# Patient Record
Sex: FEMALE | Race: WHITE | Employment: FULL TIME | URBAN - METROPOLITAN AREA
[De-identification: names, ages, dates, MRNs, and addresses within clinical notes are randomized per-mention and may not be internally consistent; named-entity substitution may affect disease eponyms.]

---

## 2020-03-02 ENCOUNTER — OFFICE VISIT (OUTPATIENT)
Dept: FAMILY MEDICINE CLINIC | Age: 30
End: 2020-03-02
Payer: COMMERCIAL

## 2020-03-02 VITALS
HEIGHT: 67 IN | DIASTOLIC BLOOD PRESSURE: 84 MMHG | OXYGEN SATURATION: 98 % | HEART RATE: 87 BPM | SYSTOLIC BLOOD PRESSURE: 122 MMHG | WEIGHT: 218.6 LBS | BODY MASS INDEX: 34.31 KG/M2 | TEMPERATURE: 97.2 F

## 2020-03-02 LAB
A/G RATIO: 1.6 (ref 1.1–2.2)
ALBUMIN SERPL-MCNC: 4.3 G/DL (ref 3.4–5)
ALP BLD-CCNC: 82 U/L (ref 40–129)
ALT SERPL-CCNC: 41 U/L (ref 10–40)
ANION GAP SERPL CALCULATED.3IONS-SCNC: 14 MMOL/L (ref 3–16)
AST SERPL-CCNC: 21 U/L (ref 15–37)
BASOPHILS ABSOLUTE: 0.1 K/UL (ref 0–0.2)
BASOPHILS RELATIVE PERCENT: 0.7 %
BILIRUB SERPL-MCNC: 0.3 MG/DL (ref 0–1)
BUN BLDV-MCNC: 8 MG/DL (ref 7–20)
C-REACTIVE PROTEIN: 4.4 MG/L (ref 0–5.1)
CALCIUM SERPL-MCNC: 9.2 MG/DL (ref 8.3–10.6)
CHLORIDE BLD-SCNC: 102 MMOL/L (ref 99–110)
CO2: 23 MMOL/L (ref 21–32)
CREAT SERPL-MCNC: 0.6 MG/DL (ref 0.6–1.1)
EOSINOPHILS ABSOLUTE: 0.1 K/UL (ref 0–0.6)
EOSINOPHILS RELATIVE PERCENT: 1.7 %
GFR AFRICAN AMERICAN: >60
GFR NON-AFRICAN AMERICAN: >60
GLOBULIN: 2.7 G/DL
GLUCOSE BLD-MCNC: 82 MG/DL (ref 70–99)
HBA1C MFR BLD: 5.1 %
HCT VFR BLD CALC: 43.4 % (ref 36–48)
HEMOGLOBIN: 14.5 G/DL (ref 12–16)
LYMPHOCYTES ABSOLUTE: 2.1 K/UL (ref 1–5.1)
LYMPHOCYTES RELATIVE PERCENT: 24.4 %
MCH RBC QN AUTO: 30.2 PG (ref 26–34)
MCHC RBC AUTO-ENTMCNC: 33.3 G/DL (ref 31–36)
MCV RBC AUTO: 90.7 FL (ref 80–100)
MONOCYTES ABSOLUTE: 0.4 K/UL (ref 0–1.3)
MONOCYTES RELATIVE PERCENT: 4.7 %
NEUTROPHILS ABSOLUTE: 5.9 K/UL (ref 1.7–7.7)
NEUTROPHILS RELATIVE PERCENT: 68.5 %
PDW BLD-RTO: 13.8 % (ref 12.4–15.4)
PLATELET # BLD: 253 K/UL (ref 135–450)
PMV BLD AUTO: 9.2 FL (ref 5–10.5)
POTASSIUM SERPL-SCNC: 4.2 MMOL/L (ref 3.5–5.1)
RBC # BLD: 4.78 M/UL (ref 4–5.2)
SEDIMENTATION RATE, ERYTHROCYTE: 9 MM/HR (ref 0–20)
SODIUM BLD-SCNC: 139 MMOL/L (ref 136–145)
TOTAL PROTEIN: 7 G/DL (ref 6.4–8.2)
TSH REFLEX: 1.74 UIU/ML (ref 0.27–4.2)
WBC # BLD: 8.6 K/UL (ref 4–11)

## 2020-03-02 PROCEDURE — 1036F TOBACCO NON-USER: CPT | Performed by: FAMILY MEDICINE

## 2020-03-02 PROCEDURE — 36415 COLL VENOUS BLD VENIPUNCTURE: CPT | Performed by: FAMILY MEDICINE

## 2020-03-02 PROCEDURE — 83036 HEMOGLOBIN GLYCOSYLATED A1C: CPT | Performed by: FAMILY MEDICINE

## 2020-03-02 PROCEDURE — G8484 FLU IMMUNIZE NO ADMIN: HCPCS | Performed by: FAMILY MEDICINE

## 2020-03-02 PROCEDURE — 99203 OFFICE O/P NEW LOW 30 MIN: CPT | Performed by: FAMILY MEDICINE

## 2020-03-02 PROCEDURE — G8427 DOCREV CUR MEDS BY ELIG CLIN: HCPCS | Performed by: FAMILY MEDICINE

## 2020-03-02 PROCEDURE — G8417 CALC BMI ABV UP PARAM F/U: HCPCS | Performed by: FAMILY MEDICINE

## 2020-03-02 RX ORDER — BUSPIRONE HYDROCHLORIDE 10 MG/1
10 TABLET ORAL 3 TIMES DAILY PRN
Qty: 90 TABLET | Refills: 0 | Status: SHIPPED | OUTPATIENT
Start: 2020-03-02 | End: 2020-04-01

## 2020-03-02 RX ORDER — SERTRALINE HYDROCHLORIDE 25 MG/1
25 TABLET, FILM COATED ORAL DAILY
Qty: 30 TABLET | Refills: 0 | Status: SHIPPED | OUTPATIENT
Start: 2020-03-02 | End: 2021-02-22 | Stop reason: SDUPTHER

## 2020-03-02 ASSESSMENT — ENCOUNTER SYMPTOMS
VOMITING: 0
COUGH: 0
NAUSEA: 0
ABDOMINAL PAIN: 0
BLOOD IN STOOL: 0
SHORTNESS OF BREATH: 0

## 2020-03-02 NOTE — PROGRESS NOTES
Subjective:      Patient ID: Mikel Peres is a 26 y.o. female. Renetta Holbrook is here to [facility]. Last PCP care was over 3 years ago. Vision changes  Past couple months, happening daily. Flashing light, vision loss. Episodes lasts a few seconds. Feels normal after the episode. No eye doctor care for this. Lipoma  History of lipoma's on her back, feels she has a new one. Depression  Recurrent/Chronic. Last medication was Remeron - diagnosed via [facility] with PTSD. She was also given hydroxyzine, and didn't feel like either one worked. Skin  For over the past 3 months - prolonged healing with hyperpigmentation of skin lesions. Legs more than arms. Review of Systems   Constitutional: Negative for fever and unexpected weight change. HENT: Negative for hearing loss. Eyes: Positive for visual disturbance. Respiratory: Negative for cough and shortness of breath. Cardiovascular: Negative for chest pain, palpitations and leg swelling. Gastrointestinal: Negative for abdominal pain, blood in stool, nausea and vomiting. Endocrine: Positive for polydipsia. Negative for cold intolerance, heat intolerance and polyphagia. Genitourinary: Negative for dysuria and hematuria. Skin: Positive for wound (slow healing wounds of lower extremeities after injury). Negative for rash. Neurological: Positive for numbness and headaches. Negative for dizziness, weakness and light-headedness. Hematological: Negative for adenopathy. Does not bruise/bleed easily. Psychiatric/Behavioral: Positive for dysphoric mood. Negative for sleep disturbance. The patient is not nervous/anxious.       Past Medical History:   Diagnosis Date    Gestational diabetes      Past Surgical History:   Procedure Laterality Date    TUBAL LIGATION      TYMPANOSTOMY TUBE PLACEMENT      x 7     Social History     Socioeconomic History    Marital status: Single     Spouse name: Not on file    Number of children: Not on file    Years of education: Not on file    Highest education level: Not on file   Occupational History    Not on file   Social Needs    Financial resource strain: Not on file    Food insecurity:     Worry: Not on file     Inability: Not on file    Transportation needs:     Medical: Not on file     Non-medical: Not on file   Tobacco Use    Smoking status: Never Smoker    Smokeless tobacco: Never Used   Substance and Sexual Activity    Alcohol use: Yes     Comment: weekly in the winter, rare rest of the year.  Drug use: No    Sexual activity: Not on file   Lifestyle    Physical activity:     Days per week: Not on file     Minutes per session: Not on file    Stress: Not on file   Relationships    Social connections:     Talks on phone: Not on file     Gets together: Not on file     Attends Mu-ism service: Not on file     Active member of club or organization: Not on file     Attends meetings of clubs or organizations: Not on file     Relationship status: Not on file    Intimate partner violence:     Fear of current or ex partner: Not on file     Emotionally abused: Not on file     Physically abused: Not on file     Forced sexual activity: Not on file   Other Topics Concern    Not on file   Social History Narrative    Not on file     Family History   Problem Relation Age of Onset    High Blood Pressure Mother     High Cholesterol Mother     Hypothyroidism Mother     Other Mother         gerd    High Blood Pressure Father     Diabetes Father         pre-DM   ,    Objective:   Physical Exam  Vitals signs and nursing note reviewed. Constitutional:       Appearance: Normal appearance. She is not ill-appearing. HENT:      Head: Normocephalic and atraumatic. Right Ear: External ear normal.      Left Ear: External ear normal.      Mouth/Throat:      Mouth: Mucous membranes are moist.   Eyes:      Extraocular Movements: Extraocular movements intact.       Conjunctiva/sclera: Conjunctivae PROTEIN   6. History of gestational diabetes  POCT glycosylated hemoglobin (Hb A1C)   7. Polydipsia  POCT glycosylated hemoglobin (Hb A1C)   8. Establishing care with new doctor, encounter for            Plan:      1. Start sertraline, as her mother is on that and works well. Allow time to become effective. Buspar for prn use. 2. Establish with optometrist for further evaluation. 3. Return to surgeon for removal.     4,5 & 7. A1c was 5.1% - unlikely DM, given the duration. Will check labs for causes and then follow up. 6. No DM currently, observe. 8. Visit to [facility] care. Follow up 1 month: D&A, labs, skin.        Current Outpatient Medications:     levonorgestrel (MIRENA, 52 MG,) IUD 52 mg, 1 each by Intrauterine route once, Disp: , Rfl:     sertraline (ZOLOFT) 25 MG tablet, Take 1 tablet by mouth daily, Disp: 30 tablet, Rfl: 0    busPIRone (BUSPAR) 10 MG tablet, Take 1 tablet by mouth 3 times daily as needed (anxiety), Disp: 90 tablet, Rfl: 0         Godwin Mckinnon MD

## 2020-06-11 ENCOUNTER — HOSPITAL ENCOUNTER (EMERGENCY)
Age: 30
Discharge: HOME | End: 2020-06-11
Attending: EMERGENCY MEDICINE
Payer: COMMERCIAL

## 2020-06-11 ENCOUNTER — APPOINTMENT (OUTPATIENT)
Dept: GENERAL RADIOLOGY | Age: 30
End: 2020-06-11
Payer: COMMERCIAL

## 2020-06-11 VITALS
BODY MASS INDEX: 35.31 KG/M2 | TEMPERATURE: 97 F | WEIGHT: 225 LBS | SYSTOLIC BLOOD PRESSURE: 146 MMHG | OXYGEN SATURATION: 97 % | RESPIRATION RATE: 18 BRPM | HEIGHT: 67 IN | DIASTOLIC BLOOD PRESSURE: 96 MMHG | HEART RATE: 89 BPM

## 2020-06-11 PROCEDURE — 73630 X-RAY EXAM OF FOOT: CPT

## 2020-06-11 PROCEDURE — 99283 EMERGENCY DEPT VISIT LOW MDM: CPT

## 2020-06-11 PROCEDURE — 73610 X-RAY EXAM OF ANKLE: CPT

## 2020-06-11 PROCEDURE — 6370000000 HC RX 637 (ALT 250 FOR IP): Performed by: EMERGENCY MEDICINE

## 2020-06-11 RX ORDER — BUSPIRONE HYDROCHLORIDE 10 MG/1
10 TABLET ORAL 3 TIMES DAILY
COMMUNITY
End: 2021-02-22 | Stop reason: SDUPTHER

## 2020-06-11 RX ORDER — NAPROXEN 500 MG/1
500 TABLET ORAL ONCE
Status: COMPLETED | OUTPATIENT
Start: 2020-06-11 | End: 2020-06-11

## 2020-06-11 RX ADMIN — NAPROXEN 500 MG: 500 TABLET ORAL at 20:54

## 2020-06-11 ASSESSMENT — PAIN DESCRIPTION - LOCATION: LOCATION: ANKLE

## 2020-06-11 ASSESSMENT — PAIN SCALES - GENERAL: PAINLEVEL_OUTOF10: 8

## 2020-06-11 ASSESSMENT — PAIN DESCRIPTION - DESCRIPTORS: DESCRIPTORS: BURNING;NUMBNESS

## 2020-06-11 ASSESSMENT — PAIN DESCRIPTION - FREQUENCY: FREQUENCY: CONTINUOUS

## 2020-06-11 ASSESSMENT — PAIN DESCRIPTION - PAIN TYPE: TYPE: ACUTE PAIN

## 2020-06-11 ASSESSMENT — PAIN DESCRIPTION - ORIENTATION: ORIENTATION: RIGHT

## 2020-06-11 ASSESSMENT — PAIN DESCRIPTION - ONSET: ONSET: SUDDEN

## 2020-06-12 NOTE — ED PROVIDER NOTES
weekly in the winter, rare rest of the year.  Drug use: No    Sexual activity: Not on file   Lifestyle    Physical activity     Days per week: Not on file     Minutes per session: Not on file    Stress: Not on file   Relationships    Social connections     Talks on phone: Not on file     Gets together: Not on file     Attends Druze service: Not on file     Active member of club or organization: Not on file     Attends meetings of clubs or organizations: Not on file     Relationship status: Not on file    Intimate partner violence     Fear of current or ex partner: Not on file     Emotionally abused: Not on file     Physically abused: Not on file     Forced sexual activity: Not on file   Other Topics Concern    Not on file   Social History Narrative    Not on file     No current facility-administered medications for this encounter. Current Outpatient Medications   Medication Sig Dispense Refill    busPIRone (BUSPAR) 10 MG tablet Take 10 mg by mouth 3 times daily      levonorgestrel (MIRENA, 52 MG,) IUD 52 mg 1 each by Intrauterine route once      sertraline (ZOLOFT) 25 MG tablet Take 1 tablet by mouth daily 30 tablet 0     No Known Allergies    Nursing Notes Reviewed    Physical Exam:  ED Triage Vitals [06/11/20 2034]   Enc Vitals Group      BP (!) 146/96      Pulse 89      Resp 18      Temp 97 °F (36.1 °C)      Temp Source Temporal      SpO2 97 %      Weight 225 lb (102.1 kg)      Height 5' 7\" (1.702 m)      Head Circumference       Peak Flow       Pain Score       Pain Loc       Pain Edu? Excl. in [address]? GENERAL APPEARANCE: Awake and alert. Cooperative. No acute distress. HEAD: Normocephalic. Atraumatic. EYES: EOM's grossly intact. Sclera anicteric. ENT: Tolerates saliva. No trismus. NECK: Supple. Trachea midline. CARDIO: Symmetric lower extremity pulses  LUNGS: Respirations unlabored. EXTREMITIES: No acute deformities.   Patient has edema across bilateral malleoli of the

## 2020-09-24 ENCOUNTER — APPOINTMENT (OUTPATIENT)
Dept: GENERAL RADIOLOGY | Age: 30
End: 2020-09-24
Payer: COMMERCIAL

## 2020-09-24 ENCOUNTER — HOSPITAL ENCOUNTER (EMERGENCY)
Age: 30
Discharge: HOME | End: 2020-09-24
Attending: EMERGENCY MEDICINE
Payer: COMMERCIAL

## 2020-09-24 VITALS
BODY MASS INDEX: 34.53 KG/M2 | SYSTOLIC BLOOD PRESSURE: 141 MMHG | TEMPERATURE: 96.9 F | RESPIRATION RATE: 16 BRPM | DIASTOLIC BLOOD PRESSURE: 90 MMHG | WEIGHT: 220 LBS | HEIGHT: 67 IN | HEART RATE: 90 BPM | OXYGEN SATURATION: 98 %

## 2020-09-24 PROCEDURE — 99283 EMERGENCY DEPT VISIT LOW MDM: CPT

## 2020-09-24 PROCEDURE — 73120 X-RAY EXAM OF HAND: CPT

## 2020-09-24 PROCEDURE — 73100 X-RAY EXAM OF WRIST: CPT

## 2020-09-24 RX ORDER — IBUPROFEN 200 MG
600 TABLET ORAL EVERY 8 HOURS PRN
Qty: 60 TABLET | Refills: 0 | OUTPATIENT
Start: 2020-09-24 | End: 2021-01-28

## 2020-09-24 ASSESSMENT — PAIN DESCRIPTION - PAIN TYPE: TYPE: ACUTE PAIN

## 2020-09-24 ASSESSMENT — PAIN DESCRIPTION - ORIENTATION: ORIENTATION: RIGHT

## 2020-09-24 ASSESSMENT — PAIN DESCRIPTION - LOCATION: LOCATION: HAND;WRIST

## 2020-09-24 ASSESSMENT — PAIN SCALES - GENERAL: PAINLEVEL_OUTOF10: 7

## 2020-09-24 NOTE — ED PROVIDER NOTES
[place] [address]  Chief Complaint   Patient presents with    Hand Pain     reports 2 weeks    Wrist Pain     since last evening after throwing a dart. HISTORY OF PRESENT ILLNESS  Nathalie Rosas is a 31 y.o. female who presents to the ED complaining of pain of the right palmar hand near the third MCP, with a palpable knot that has been getting bigger over that timeframe. She is never had what is before. It is not red or an open wound or associated with any specific injury. Yesterday she was throwing darts with the right hand and in the process of throwing the dark she had an acute wrist pain that shot pain through to the elbow, currently she only has wrist pain though. She does not know if this is related to the other right hand problem mentioned above. She denies any weakness to the right hand at all. She has not tried anything at all for the pain at any point. She is right-hand dominant. She denies any symptoms in the left hand, or the proximal right upper extremity, right elbow or right forearm. No other complaints, modifying factors or associated symptoms. Nursing notes reviewed.    Past Medical History:   Diagnosis Date    Gestational diabetes      Past Surgical History:   Procedure Laterality Date    TUBAL LIGATION      TYMPANOSTOMY TUBE PLACEMENT      x 7     Family History   Problem Relation Age of Onset    High Blood Pressure Mother     High Cholesterol Mother     Hypothyroidism Mother     Other Mother         gerd    High Blood Pressure Father     Diabetes Father         pre-DM     Social History     Socioeconomic History    Marital status: Single     Spouse name: Not on file    Number of children: Not on file    Years of education: Not on file    Highest education level: Not on file   Occupational History    Not on file   Social Needs    Financial resource strain: Not on file    Food insecurity     Worry: Not on file     Inability: Not on file    Transportation needs     Medical: Not on file     Non-medical: Not on file   Tobacco Use    Smoking status: Never Smoker    Smokeless tobacco: Never Used   Substance and Sexual Activity    Alcohol use: Not Currently     Comment: weekly in the winter, rare rest of the year.  Drug use: No    Sexual activity: Not on file   Lifestyle    Physical activity     Days per week: Not on file     Minutes per session: Not on file    Stress: Not on file   Relationships    Social connections     Talks on phone: Not on file     Gets together: Not on file     Attends Temple service: Not on file     Active member of club or organization: Not on file     Attends meetings of clubs or organizations: Not on file     Relationship status: Not on file    Intimate partner violence     Fear of current or ex partner: Not on file     Emotionally abused: Not on file     Physically abused: Not on file     Forced sexual activity: Not on file   Other Topics Concern    Not on file   Social History Narrative    Not on file     No current facility-administered medications for this encounter. Current Outpatient Medications   Medication Sig Dispense Refill    ibuprofen (ADVIL) 200 MG tablet Take 3 tablets by mouth every 8 hours as needed for Pain 60 tablet 0    busPIRone (BUSPAR) 10 MG tablet Take 10 mg by mouth 3 times daily      levonorgestrel (MIRENA, 52 MG,) IUD 52 mg 1 each by Intrauterine route once      sertraline (ZOLOFT) 25 MG tablet Take 1 tablet by mouth daily 30 tablet 0     No Known Allergies    REVIEW OF SYSTEMS  6 systems reviewed, pertinent positives per HPI otherwise noted to be negative    PHYSICAL EXAM   BP (!) 141/90   Pulse 90   Temp 96.9 °F (36.1 °C) (Tympanic)   Resp 16   Ht 5' 7\" (1.702 m)   Wt 220 lb (99.8 kg)   SpO2 98%   BMI 34.46 kg/m²    GENERAL APPEARANCE: Awake and alert. Cooperative. No acute distress. HEAD: Normocephalic. Atraumatic. EYES: PERRL. EOM's grossly intact. ENT: Mucous membranes are moist.   NECK: Supple. Normal ROM. CHEST: Equal symmetric chest rise. LUNGS: Breathing is unlabored. Speaking comfortably in full sentences. ABDOMEN: Nondistended, nontender  MUSCULOSKELETAL:  RUE: Mildly tender palpable mobile firm knot is noted along the flexor tendon sheath of the right middle finger near the MCP on the palmar aspect. No other hand tenderness is noted. There is mild tenderness diffusely over the carpal bones. No focal scaphoid tenderness. No forearm, elbow, or upper arm tenderness. 2+ radial pulse. Brisk cap refill x5 digits. Sensation and motor function fully intact in the radial, ulnar, and median nerve distribution. Full range of motion of all major joints. Cardinal movements of hand fully intact. No erythema, bruising, or lacerations. Comparments are soft. LUE:  No tenderness. 2+ radial pulse. Brisk cap refill x5 digits. Sensation and motor function fully intact in the radial, ulnar, and median nerve distribution. Full range of motion of all major joints. Cardinal movements of hand fully intact. No erythema, bruising, or lacerations. Comparments are soft. SKIN: Warm and dry. No acute rashes. NEUROLOGICAL: Alert and oriented. Strength is 5/5 in all extremities and sensation is intact. RADIOLOGY    Xr Wrist Right (2 Views)    Result Date: 9/24/2020  EXAMINATION: TWO XRAY VIEWS OF THE RIGHT HAND; 3  XRAY VIEWS OF THE RIGHT WRIST 9/24/2020 4:52 pm COMPARISON: None. HISTORY: ORDERING SYSTEM PROVIDED HISTORY: pain - attn: 3rd MCP joint palmar aspect (?cyst) TECHNOLOGIST PROVIDED HISTORY: Acuity: Acute Type of Exam: Initial Additional signs and symptoms: rt hand pain Relevant Medical/Surgical History: NKI RIGHT WRIST FINDINGS: Osseous structures of the wrist are intact and align normally. Joint spaces are well maintained. No retained radiopaque foreign body. Soft tissues appear unremarkable.  RIGHT HAND FINDINGS: Osseous structures of the right hand appear intact and align normally. No retained radiopaque foreign body. Joint spaces appear well maintained. 1. RIGHT HAND: No acute osseous abnormality evident. 2. RIGHT WRIST: No acute osseous abnormality is demonstrated. 3. Note that if pain persists or worsens, then additional evaluation with follow-up x-rays or MRI should be considered. Xr Hand Right (2 Views)    Result Date: 9/24/2020  EXAMINATION: TWO XRAY VIEWS OF THE RIGHT HAND; 3  XRAY VIEWS OF THE RIGHT WRIST 9/24/2020 4:52 pm COMPARISON: None. HISTORY: ORDERING SYSTEM PROVIDED HISTORY: pain - attn: 3rd MCP joint palmar aspect (?cyst) TECHNOLOGIST PROVIDED HISTORY: Acuity: Acute Type of Exam: Initial Additional signs and symptoms: rt hand pain Relevant Medical/Surgical History: NKI RIGHT WRIST FINDINGS: Osseous structures of the wrist are intact and align normally. Joint spaces are well maintained. No retained radiopaque foreign body. Soft tissues appear unremarkable. RIGHT HAND FINDINGS: Osseous structures of the right hand appear intact and align normally. No retained radiopaque foreign body. Joint spaces appear well maintained. 1. RIGHT HAND: No acute osseous abnormality evident. 2. RIGHT WRIST: No acute osseous abnormality is demonstrated. 3. Note that if pain persists or worsens, then additional evaluation with follow-up x-rays or MRI should be considered. ED COURSE/MDM  Differential diagnosis considerations included: abrasion/laceration, contusion, fracture, sprain/strain, dislocation, ganglion cyst, tendinitis    The patient's ED course was notable for appears to be a right flexor tendon ganglion cyst of the third finger, no signs of infectious process, no signs of neurovascular compromise or sensorimotor dysfunction or contracture. Patient also has a wrist sprain from throwing a dart.   X-rays are unremarkable of the hand and the wrist.  Patient was referred to orthopedics and placed in a Velcro wrist splint and prescribed NSAIDs. Patient was given scripts for the following medications. I counseled patient how to take these medications. New Prescriptions    IBUPROFEN (ADVIL) 200 MG TABLET    Take 3 tablets by mouth every 8 hours as needed for Pain         CLINICAL IMPRESSION  1. Ganglion cyst of flexor tendon sheath of finger of right hand    2. Acute pain of right wrist        Blood pressure (!) 141/90, pulse 90, temperature 96.9 °F (36.1 °C), temperature source Tympanic, resp. rate 16, height 5' 7\" (1.702 m), weight 220 lb (99.8 kg), SpO2 98 %. DISPOSITION    I have discussed the findings of today's workup with the patient and addressed the patient's questions and concerns. Important warning signs as well as new or worsening symptoms which would necessitate immediate return to the ED were discussed. The plan is to discharge from the ED at this time, and the patient is in stable condition. The patient acknowledged understanding is agreeable with this plan. Follow-up with:  Martha Masters MD  [address]  [phone]    Schedule an appointment as soon as possible for a visit in 1 week  For symptom re-evaluation    [facility]  [address] [facility]  [address] [place] [address] Road  [phone]  Go to   If symptoms worsen      This chart was created using Dragon dictation software. Efforts were made by me to ensure accuracy, however some errors may be present due to limitations of this technology.         Yossi Cruz MD  09/24/20 7488

## 2021-01-28 ENCOUNTER — HOSPITAL ENCOUNTER (EMERGENCY)
Age: 31
Discharge: HOME | End: 2021-01-28
Attending: EMERGENCY MEDICINE
Payer: COMMERCIAL

## 2021-01-28 VITALS
SYSTOLIC BLOOD PRESSURE: 143 MMHG | DIASTOLIC BLOOD PRESSURE: 97 MMHG | TEMPERATURE: 96.8 F | RESPIRATION RATE: 15 BRPM | OXYGEN SATURATION: 100 % | BODY MASS INDEX: 33.74 KG/M2 | HEART RATE: 82 BPM | WEIGHT: 215 LBS | HEIGHT: 67 IN

## 2021-01-28 DIAGNOSIS — M54.42 ACUTE LEFT-SIDED LOW BACK PAIN WITH LEFT-SIDED SCIATICA: Primary | ICD-10-CM

## 2021-01-28 PROCEDURE — 99283 EMERGENCY DEPT VISIT LOW MDM: CPT

## 2021-01-28 RX ORDER — LIDOCAINE 50 MG/G
1 PATCH TOPICAL DAILY
Qty: 10 PATCH | Refills: 0 | Status: SHIPPED | OUTPATIENT
Start: 2021-01-28 | End: 2021-02-07

## 2021-01-28 RX ORDER — METHYLPREDNISOLONE 4 MG/1
TABLET ORAL
Qty: 1 KIT | Refills: 0 | Status: SHIPPED | OUTPATIENT
Start: 2021-01-28 | End: 2021-02-22

## 2021-01-28 RX ORDER — NAPROXEN 500 MG/1
500 TABLET ORAL 2 TIMES DAILY PRN
Qty: 60 TABLET | Refills: 0 | Status: SHIPPED | OUTPATIENT
Start: 2021-01-28 | End: 2021-02-22 | Stop reason: ALTCHOICE

## 2021-01-28 RX ORDER — CYCLOBENZAPRINE HCL 10 MG
10 TABLET ORAL 3 TIMES DAILY PRN
Qty: 20 TABLET | Refills: 0 | Status: SHIPPED | OUTPATIENT
Start: 2021-01-28 | End: 2021-02-07

## 2021-01-28 ASSESSMENT — PAIN DESCRIPTION - FREQUENCY: FREQUENCY: CONTINUOUS

## 2021-01-28 ASSESSMENT — PAIN SCALES - GENERAL: PAINLEVEL_OUTOF10: 10

## 2021-01-28 ASSESSMENT — PAIN DESCRIPTION - LOCATION: LOCATION: BACK

## 2021-01-28 NOTE — ED PROVIDER NOTES
Activity    Alcohol use: Not Currently     Comment: weekly in the winter, rare rest of the year.  Drug use: No    Sexual activity: Not on file   Lifestyle    Physical activity     Days per week: Not on file     Minutes per session: Not on file    Stress: Not on file   Relationships    Social connections     Talks on phone: Not on file     Gets together: Not on file     Attends Anabaptism service: Not on file     Active member of club or organization: Not on file     Attends meetings of clubs or organizations: Not on file     Relationship status: Not on file    Intimate partner violence     Fear of current or ex partner: Not on file     Emotionally abused: Not on file     Physically abused: Not on file     Forced sexual activity: Not on file   Other Topics Concern    Not on file   Social History Narrative    Not on file     No current facility-administered medications for this encounter. Current Outpatient Medications   Medication Sig Dispense Refill    cyclobenzaprine (FLEXERIL) 10 MG tablet Take 1 tablet by mouth 3 times daily as needed for Muscle spasms (CAUTION: Can cause dizziness, don't drive while taking.) 20 tablet 0    lidocaine (LIDODERM) 5 % Place 1 patch onto the skin daily for 10 days 12 hours on, 12 hours off. 10 patch 0    methylPREDNISolone (MEDROL, LAURA,) 4 MG tablet Take by mouth.  1 kit 0    naproxen (NAPROSYN) 500 MG tablet Take 1 tablet by mouth 2 times daily as needed for Pain 60 tablet 0    busPIRone (BUSPAR) 10 MG tablet Take 10 mg by mouth 3 times daily      levonorgestrel (MIRENA, 52 MG,) IUD 52 mg 1 each by Intrauterine route once      sertraline (ZOLOFT) 25 MG tablet Take 1 tablet by mouth daily 30 tablet 0     No Known Allergies    REVIEW OF SYSTEMS  6 systems reviewed, pertinent positives per HPI otherwise noted to be negative    PHYSICAL EXAM   BP (!) 143/97   Pulse 82   Temp 96.8 °F (36 °C)   Resp 15   Ht 5' 7\" (1.702 m)   Wt 215 lb (97.5 kg)   SpO2 100% BMI 33.67 kg/m²    GENERAL APPEARANCE: Awake and alert. Cooperative. No acute distress. HEAD: Normocephalic. Atraumatic. EYES: PERRL. EOM's grossly intact. ENT: Mucous membranes are moist.   NECK: Supple. Normal ROM. CHEST: Equal symmetric chest rise. RRR  LUNGS: Breathing is unlabored. Speaking comfortably in full sentences. CTAB  ABDOMEN: Nondistended, nontender  BACK:      Cervical: no tenderness noted, no midline tenderness, no paraspinous spasm      Thoracic: no tenderness noted, no midline tenderness, no paraspinous spasm      Lumbar: L paraspinal ttp with spasm noted, no midline or R sided ttp on my exam.  +SLR LLE, neg SLR RLE. MUSCULOSKELETAL:  RLE: No tenderness. 2+ DP and PT. Sensation and motor function fully intact. Full range of motion of all major joints. No erythema, bruising, or lacerations. Compartments are soft. 2+ patellar reflex. Achilles nontender and intact. Able to bear weight. No joint swelling or effusions are noted. LLE: No tenderness. 2+ DP and PT. Sensation and motor function fully intact. Full range of motion of all major joints. No erythema, bruising, or lacerations. Compartments are soft. 2+ patellar reflex. Achilles nontender and intact. Able to bear weight. No joint swelling or effusions are noted. SKIN: Warm and dry. No acute rashes. NEUROLOGICAL: Alert and oriented. Strength is 5/5 in all extremities and sensation is intact. Gait normal.    ED COURSE/MDM  Differential diagnosis considerations included: abdominal aortic aneurysm, cauda equina syndrome, epidural mass lesion, spinal stenosis, herniated disk causing severe stenosis, sprain/strain, fracture, contusion, sciatica, UTI, pyelonephritis, kidney stone    The patient's ED course was notable for left-sided low back pain with sciatica clinically on examination. She exhibits no historical or physical exam findings red flags for cauda equina syndrome or spinal cord compression.   There has been no trauma. Given atraumatic back pain and [facility] CMT for back pain I do not feel that imaging is currently indicated. Recommended a conservative approach with the patient who can follow-up with her PCP in a week or so and if necessary obtain advanced imaging at that time. We will start her on naproxen instead of ibuprofen, add Medrol Dosepak Flexeril and Lidoderm patches and return precautions for new or worsening symptoms including red flag symptoms. Patient was given scripts for the following medications. I counseled patient how to take these medications. New Prescriptions    CYCLOBENZAPRINE (FLEXERIL) 10 MG TABLET    Take 1 tablet by mouth 3 times daily as needed for Muscle spasms (CAUTION: Can cause dizziness, don't drive while taking.)    LIDOCAINE (LIDODERM) 5 %    Place 1 patch onto the skin daily for 10 days 12 hours on, 12 hours off. METHYLPREDNISOLONE (MEDROL, LAURA,) 4 MG TABLET    Take by mouth. NAPROXEN (NAPROSYN) 500 MG TABLET    Take 1 tablet by mouth 2 times daily as needed for Pain         CLINICAL IMPRESSION  1. Acute left-sided low back pain with left-sided sciatica        Blood pressure (!) 143/97, pulse 82, temperature 96.8 °F (36 °C), resp. rate 15, height 5' 7\" (1.702 m), weight 215 lb (97.5 kg), SpO2 100 %. DISPOSITION    I have discussed the findings of today's workup with the patient and addressed the patient's questions and concerns. Important warning signs as well as new or worsening symptoms which would necessitate immediate return to the ED were discussed. The plan is to discharge from the ED at this time, and the patient is in stable condition. The patient acknowledged understanding is agreeable with this plan.       Follow-up with:  Mary Seay MD  [address] [facility]  [address] [facility] [address] [facility]  [phone]    Schedule an appointment as soon as possible for a visit in 1 week  For symptom re-evaluation    [address] [facility] [address] [facility]  [phone]  Go to   If symptoms worsen      This chart was created using Dragon dictation software. Efforts were made by me to ensure accuracy, however some errors may be present due to limitations of this technology.         Erwin Perez MD  01/28/21 3129

## 2021-02-22 ENCOUNTER — OFFICE VISIT (OUTPATIENT)
Dept: FAMILY MEDICINE CLINIC | Age: 31
End: 2021-02-22
Payer: COMMERCIAL

## 2021-02-22 VITALS
OXYGEN SATURATION: 98 % | WEIGHT: 215.4 LBS | HEART RATE: 103 BPM | BODY MASS INDEX: 33.74 KG/M2 | SYSTOLIC BLOOD PRESSURE: 130 MMHG | DIASTOLIC BLOOD PRESSURE: 86 MMHG | TEMPERATURE: 97.3 F

## 2021-02-22 DIAGNOSIS — Z76.89 ENCOUNTER TO ESTABLISH CARE: Primary | ICD-10-CM

## 2021-02-22 DIAGNOSIS — F32.A DEPRESSION, UNSPECIFIED DEPRESSION TYPE: ICD-10-CM

## 2021-02-22 DIAGNOSIS — H53.9 VISION CHANGES: ICD-10-CM

## 2021-02-22 DIAGNOSIS — Z76.0 MEDICATION REFILL: ICD-10-CM

## 2021-02-22 PROCEDURE — G8417 CALC BMI ABV UP PARAM F/U: HCPCS | Performed by: NURSE PRACTITIONER

## 2021-02-22 PROCEDURE — G8427 DOCREV CUR MEDS BY ELIG CLIN: HCPCS | Performed by: NURSE PRACTITIONER

## 2021-02-22 PROCEDURE — 99213 OFFICE O/P EST LOW 20 MIN: CPT | Performed by: NURSE PRACTITIONER

## 2021-02-22 PROCEDURE — G8484 FLU IMMUNIZE NO ADMIN: HCPCS | Performed by: NURSE PRACTITIONER

## 2021-02-22 PROCEDURE — 1036F TOBACCO NON-USER: CPT | Performed by: NURSE PRACTITIONER

## 2021-02-22 RX ORDER — SERTRALINE HYDROCHLORIDE 25 MG/1
25 TABLET, FILM COATED ORAL DAILY
Qty: 30 TABLET | Refills: 2 | Status: SHIPPED | OUTPATIENT
Start: 2021-02-22 | End: 2022-11-03

## 2021-02-22 RX ORDER — BUSPIRONE HYDROCHLORIDE 10 MG/1
10 TABLET ORAL 3 TIMES DAILY
Qty: 90 TABLET | Refills: 2 | Status: SHIPPED | OUTPATIENT
Start: 2021-02-22 | End: 2022-11-03

## 2021-02-22 ASSESSMENT — PATIENT HEALTH QUESTIONNAIRE - PHQ9
SUM OF ALL RESPONSES TO PHQ QUESTIONS 1-9: 15
4. FEELING TIRED OR HAVING LITTLE ENERGY: 3
6. FEELING BAD ABOUT YOURSELF - OR THAT YOU ARE A FAILURE OR HAVE LET YOURSELF OR YOUR FAMILY DOWN: 2
SUM OF ALL RESPONSES TO PHQ QUESTIONS 1-9: 15
SUM OF ALL RESPONSES TO PHQ QUESTIONS 1-9: 14
7. TROUBLE CONCENTRATING ON THINGS, SUCH AS READING THE NEWSPAPER OR WATCHING TELEVISION: 0
1. LITTLE INTEREST OR PLEASURE IN DOING THINGS: 3
8. MOVING OR SPEAKING SO SLOWLY THAT OTHER PEOPLE COULD HAVE NOTICED. OR THE OPPOSITE, BEING SO FIGETY OR RESTLESS THAT YOU HAVE BEEN MOVING AROUND A LOT MORE THAN USUAL: 2
10. IF YOU CHECKED OFF ANY PROBLEMS, HOW DIFFICULT HAVE THESE PROBLEMS MADE IT FOR YOU TO DO YOUR WORK, TAKE CARE OF THINGS AT HOME, OR GET ALONG WITH OTHER PEOPLE: 1
SUM OF ALL RESPONSES TO PHQ9 QUESTIONS 1 & 2: 3

## 2021-02-22 ASSESSMENT — COLUMBIA-SUICIDE SEVERITY RATING SCALE - C-SSRS
6. HAVE YOU EVER DONE ANYTHING, STARTED TO DO ANYTHING, OR PREPARED TO DO ANYTHING TO END YOUR LIFE?: NO
2. HAVE YOU ACTUALLY HAD ANY THOUGHTS OF KILLING YOURSELF?: NO

## 2021-02-23 ASSESSMENT — ENCOUNTER SYMPTOMS
WHEEZING: 0
COUGH: 0
EYE ITCHING: 0
EYE DISCHARGE: 0
PHOTOPHOBIA: 0
CHEST TIGHTNESS: 0
EYE PAIN: 0
SHORTNESS OF BREATH: 0
EYE REDNESS: 0

## 2021-02-23 NOTE — PROGRESS NOTES
Nathalie R Caufidelia  1990  31 y.o. SUBJECT CASIE:    Chief Complaint   Patient presents with   Ambreen Nieto is a 31year old female who is in to establish care. She states her last PCP had started anti-depression medication but she has been out since he left [facility] (almost 8 months). She states \"I thought I could do without but since the last 4 months I have been irritable and having a lot of anxiety. I think I need to be back on my medication. \"     She states she has two children, 7 yo and 2 yo. She states she went through a separation around October and since then has noted an increase in her symptoms of anxiety and depression. She states she has had some problems with mental health in the past and did see counseling. She states she works full time at [employer] as a supervisor and has some stress at work. Current Outpatient Medications on File Prior to Visit   Medication Sig Dispense Refill    levonorgestrel (MIRENA, 52 MG,) IUD 52 mg 1 each by Intrauterine route once      [DISCONTINUED] ibuprofen (ADVIL) 200 MG tablet Take 3 tablets by mouth every 8 hours as needed for Pain 60 tablet 0     No current facility-administered medications on file prior to visit.         Past Medical History:   Diagnosis Date    Gestational diabetes      Past Surgical History:   Procedure Laterality Date    TUBAL LIGATION      TYMPANOSTOMY TUBE PLACEMENT      x 7     Family History   Problem Relation Age of Onset    High Blood Pressure Mother     High Cholesterol Mother     Hypothyroidism Mother     Other Mother         gerd    High Blood Pressure Father     Diabetes Father         pre-DM     Social History     Socioeconomic History    Marital status: Single     Spouse name: Not on file    Number of children: Not on file    Years of education: Not on file    Highest education level: Not on file   Occupational History    Not on file   Social Needs    Financial resource strain: Not on file    Food insecurity     Worry: Not on file     Inability: Not on file    Transportation needs     Medical: Not on file     Non-medical: Not on file   Tobacco Use    Smoking status: Never Smoker    Smokeless tobacco: Never Used   Substance and Sexual Activity    Alcohol use: Not Currently     Comment: weekly in the winter, rare rest of the year.  Drug use: No    Sexual activity: Not on file   Lifestyle    Physical activity     Days per week: Not on file     Minutes per session: Not on file    Stress: Not on file   Relationships    Social connections     Talks on phone: Not on file     Gets together: Not on file     Attends Religion service: Not on file     Active member of club or organization: Not on file     Attends meetings of clubs or organizations: Not on file     Relationship status: Not on file    Intimate partner violence     Fear of current or ex partner: Not on file     Emotionally abused: Not on file     Physically abused: Not on file     Forced sexual activity: Not on file   Other Topics Concern    Not on file   Social History Narrative    Not on file       Review of Systems   Constitutional: Negative for activity change, appetite change, chills, diaphoresis, fatigue, fever and unexpected weight change. Eyes: Positive for visual disturbance (some blurriness). Negative for photophobia, pain, discharge, redness and itching. Respiratory: Negative for cough, chest tightness, shortness of breath and wheezing. Cardiovascular: Negative for chest pain and palpitations. Neurological: Negative for dizziness, seizures, syncope, weakness, light-headedness and headaches. Psychiatric/Behavioral: Positive for dysphoric mood and sleep disturbance. Negative for agitation, confusion, decreased concentration, self-injury and suicidal ideas. The patient is nervous/anxious.         OBJECTIVE:     /86 (Site: Right Upper Arm, Position: Sitting, Cuff Size: Large Adult)   Pulse 103   Temp 97.3 °F (36.3 °C) (Infrared)   Wt 215 lb 6.4 oz (97.7 kg)   SpO2 98%   BMI 33.74 kg/m²     Physical Exam  Vitals signs reviewed. Constitutional:       General: She is not in acute distress. Appearance: Normal appearance. She is well-developed. She is not ill-appearing or diaphoretic. HENT:      Head: Normocephalic and atraumatic. Right Ear: Tympanic membrane and external ear normal.      Left Ear: Tympanic membrane and external ear normal.      Nose: Nose normal. No congestion or rhinorrhea. Mouth/Throat:      Mouth: Mucous membranes are moist.      Pharynx: Oropharynx is clear. No oropharyngeal exudate or posterior oropharyngeal erythema. Eyes:      General: No scleral icterus. Conjunctiva/sclera: Conjunctivae normal.      Pupils: Pupils are equal, round, and reactive to light. Neck:      Musculoskeletal: Normal range of motion and neck supple. No neck rigidity or muscular tenderness. Cardiovascular:      Rate and Rhythm: Normal rate and regular rhythm. Heart sounds: Normal heart sounds. No murmur. No friction rub. No gallop. Pulmonary:      Effort: Pulmonary effort is normal. No respiratory distress. Breath sounds: Normal breath sounds. No wheezing. Chest:      Chest wall: No tenderness. Abdominal:      General: Abdomen is flat. Bowel sounds are normal. There is no distension. Palpations: Abdomen is soft. There is no mass. Tenderness: There is no abdominal tenderness. Musculoskeletal: Normal range of motion. Right lower leg: No edema. Left lower leg: No edema. Lymphadenopathy:      Cervical: No cervical adenopathy. Skin:     General: Skin is warm and dry. Neurological:      Mental Status: She is alert and oriented to person, place, and time. Motor: No weakness. Gait: Gait normal.   Psychiatric:         Behavior: Behavior normal.         Thought Content:  Thought content normal.         Judgment: Judgment normal.         No results found for requested labs within last 30 days. Hemoglobin A1C (%)   Date Value   03/02/2020 5.1       Lab Results   Component Value Date    WBC 8.6 03/02/2020    WBC 9.6 05/11/2015    NEUTROABS 5.9 03/02/2020    HGB 14.5 03/02/2020    HGB 13.6 05/11/2015    HCT 43.4 03/02/2020    HCT 39.8 05/11/2015    MCV 90.7 03/02/2020    MCV 90.7 05/11/2015     03/02/2020     05/11/2015    SEGSABS 6.6 05/11/2015    LYMPHSABS 2.1 03/02/2020    MONOSABS 0.4 03/02/2020    EOSABS 0.1 03/02/2020    BASOSABS 0.1 03/02/2020     No results found for: TSH, TSHHS  Lab Results   Component Value Date    LABALBU 4.3 03/02/2020    BILITOT 0.3 03/02/2020    AST 21 03/02/2020    ALT 41 03/02/2020    ALKPHOS 82 03/02/2020             No results found for this visit on 02/22/21. ASSESSMENT AND PLAN:     1. Depression, unspecified depression type  - sertraline (ZOLOFT) 25 MG tablet; Take 1 tablet by mouth daily  Dispense: 30 tablet; Refill: 2  - busPIRone (BUSPAR) 10 MG tablet; Take 1 tablet by mouth 3 times daily  Dispense: 90 tablet; Refill: 2  - Amb External Referral To Psychology  - COMPREHENSIVE METABOLIC PANEL; Future  - TSH without Reflex; Future  - CBC WITH AUTO DIFFERENTIAL; Future    2. Encounter to establish care    3. Medication refill  - sertraline (ZOLOFT) 25 MG tablet; Take 1 tablet by mouth daily  Dispense: 30 tablet; Refill: 2  - busPIRone (BUSPAR) 10 MG tablet; Take 1 tablet by mouth 3 times daily  Dispense: 90 tablet; Refill: 2    4. Vision changes  - HEMOGLOBIN A1C; Future    Fluids, rest  Get lab work done  We will call you with results  Medications refilled  Referred to [facility] for counseling  Verbalized understanding and agreement with plan    No follow-ups on file. Care discussed with patient. Patient educated on signs and symptoms of exacerbation and when to seek further medical attention. Advised to call for any problems, questions, or concerns. Patient verbalizes understanding and agrees with plan. Medications reviewed and reconciled. Continue current medications. Appropriate prescriptions are ordered. Risks and benefits of meds are discussed. After visit summary provided.

## 2021-04-25 ENCOUNTER — HOSPITAL ENCOUNTER (EMERGENCY)
Age: 31
Discharge: HOME | End: 2021-04-25
Attending: EMERGENCY MEDICINE
Payer: COMMERCIAL

## 2021-04-25 VITALS
RESPIRATION RATE: 20 BRPM | DIASTOLIC BLOOD PRESSURE: 93 MMHG | WEIGHT: 215 LBS | TEMPERATURE: 96.5 F | HEIGHT: 67 IN | SYSTOLIC BLOOD PRESSURE: 132 MMHG | HEART RATE: 82 BPM | BODY MASS INDEX: 33.74 KG/M2 | OXYGEN SATURATION: 97 %

## 2021-04-25 DIAGNOSIS — H92.02 OTALGIA OF LEFT EAR: ICD-10-CM

## 2021-04-25 DIAGNOSIS — J06.9 ACUTE UPPER RESPIRATORY INFECTION: ICD-10-CM

## 2021-04-25 DIAGNOSIS — H65.05 RECURRENT ACUTE SEROUS OTITIS MEDIA OF LEFT EAR: Primary | ICD-10-CM

## 2021-04-25 PROCEDURE — 99284 EMERGENCY DEPT VISIT MOD MDM: CPT

## 2021-04-25 PROCEDURE — 6370000000 HC RX 637 (ALT 250 FOR IP): Performed by: EMERGENCY MEDICINE

## 2021-04-25 RX ORDER — DOXYCYCLINE HYCLATE 100 MG
100 TABLET ORAL 2 TIMES DAILY
Qty: 20 TABLET | Refills: 0 | Status: SHIPPED | OUTPATIENT
Start: 2021-04-25 | End: 2021-05-05

## 2021-04-25 RX ORDER — NAPROXEN 500 MG/1
500 TABLET ORAL ONCE
Status: COMPLETED | OUTPATIENT
Start: 2021-04-25 | End: 2021-04-25

## 2021-04-25 RX ORDER — BENZONATATE 200 MG/1
200 CAPSULE ORAL 3 TIMES DAILY PRN
Qty: 30 CAPSULE | Refills: 0 | Status: SHIPPED | OUTPATIENT
Start: 2021-04-25 | End: 2021-05-02

## 2021-04-25 RX ORDER — NAPROXEN 500 MG/1
500 TABLET ORAL 2 TIMES DAILY PRN
Qty: 60 TABLET | Refills: 0 | Status: SHIPPED | OUTPATIENT
Start: 2021-04-25 | End: 2022-11-03

## 2021-04-25 RX ORDER — DOXYCYCLINE HYCLATE 100 MG
100 TABLET ORAL ONCE
Status: COMPLETED | OUTPATIENT
Start: 2021-04-25 | End: 2021-04-25

## 2021-04-25 RX ADMIN — NAPROXEN 500 MG: 500 TABLET ORAL at 16:59

## 2021-04-25 RX ADMIN — DOXYCYCLINE HYCLATE 100 MG: 100 TABLET, COATED ORAL at 16:59

## 2021-04-25 ASSESSMENT — PAIN DESCRIPTION - LOCATION: LOCATION: EAR

## 2021-04-25 ASSESSMENT — PAIN DESCRIPTION - ORIENTATION
ORIENTATION: LEFT
ORIENTATION: LEFT

## 2021-04-25 ASSESSMENT — PAIN SCALES - GENERAL
PAINLEVEL_OUTOF10: 9
PAINLEVEL_OUTOF10: 9
PAINLEVEL_OUTOF10: 8

## 2021-04-25 ASSESSMENT — PAIN DESCRIPTION - PAIN TYPE: TYPE: ACUTE PAIN

## 2021-04-25 NOTE — ED NOTES
The patient has 2 children with her. The youngest boy is \"playing on the stool\" while mom is not doing anything about it. The stool was removed from the room.            Emi Millard RN  04/25/21 67

## 2021-04-25 NOTE — ED NOTES
The patient presents to the er today with complaints of left ear pain for more than a week.  She reports that she has had tubes in her ears and other ear surgeries in the past.                  Keyshawn Gallardo RN  04/25/21 7793

## 2021-04-25 NOTE — ED NOTES
Discharge instructions and prescriptions were reviewed and the patient will follow up with the PCP, and the ENT. The patient understands these instructions.       Jayden Lynn RN  04/25/21 5528

## 2021-04-25 NOTE — ED PROVIDER NOTES
Emergency Department Encounter    Patient: Cassi Gonzalez  MRN: [id number]  : 1990  Date of Evaluation: 2021  ED Provider:  Ganesh Bustillo    Triage Chief Complaint:   Otalgia (left ear)    [place]:  Cassi Gonzalez is a 27 y.o. female that presents to the emergency department with a constellation of symptoms, primarily left ear pain. Patient reports that about 1 week ago she first developed some head congestion. She reports a history of \"bad ears\" involving multiple ear infections, especially as a child. She has followed up with an ENT specialist in [facility], but they have told her that the tympanostomy tubes are not generally provided to adults. She reports pressure and aching quality pain in the left ear. This is generally constant in timing. She reports some more generalized head congestion and an occasional cough. She denies any fevers, chills, sweats, nausea or vomiting, change in smell or taste senses, diarrhea, or other systemic complaints. Patient does not wish to be tested for COVID-19. ROS - see HPI, below listed is current ROS at time of my eval:  General:  No fevers, no chills, no weakness  Eyes:  No recent vison changes, no discharge  ENT: No sore throat, + nasal congestion.   Cardiovascular:  No chest pain, no palpitations  Respiratory:  No shortness of breath, + cough, no wheezing  Gastrointestinal:  No pain, no nausea, no vomiting, no diarrhea  Musculoskeletal:  No muscle pain  Skin:  No rash,   Neurologic:  No speech problems, no headache  Genitourinary:  No dysuria  Extremities:  no edema, no pain    Past Medical History:   Diagnosis Date    Gestational diabetes      Past Surgical History:   Procedure Laterality Date    TUBAL LIGATION      TYMPANOSTOMY TUBE PLACEMENT      x 7     Family History   Problem Relation Age of Onset    High Blood Pressure Mother     High Cholesterol Mother     Hypothyroidism Mother     Other Mother         gerd    High Blood Pressure Father needed for Cough 30 capsule 0    sertraline (ZOLOFT) 25 MG tablet Take 1 tablet by mouth daily 30 tablet 2    busPIRone (BUSPAR) 10 MG tablet Take 1 tablet by mouth 3 times daily 90 tablet 2    levonorgestrel (MIRENA, 52 MG,) IUD 52 mg 1 each by Intrauterine route once       No Known Allergies    Nursing Notes Reviewed    Physical Exam:  Triage VS:    ED Triage Vitals   Enc Vitals Group      BP 04/25/21 1639 (!) 132/93      Pulse 04/25/21 1639 82      Resp 04/25/21 1639 20      Temp 04/25/21 1638 96.5 °F (35.8 °C)      Temp Source 04/25/21 1638 Infrared      SpO2 04/25/21 1639 97 %      Weight 04/25/21 1638 215 lb (97.5 kg)      Height 04/25/21 1638 5' 7\" (1.702 m)      Head Circumference --       Peak Flow --       Pain Score --       Pain Loc --       Pain Edu? --       Excl. in [address]? --        My pulse ox interpretation is - normal    General appearance:  No acute distress. Patient is sitting in a comfortable position on exam table. Patient speaks in full and complete sentences with clear voice. Skin:  Warm. Dry. Eye:  Extraocular movements intact. No redness or matting. Extraocular muscles intact. Ears, nose, mouth and throat:  Oral mucosa moist.  Left TM moderately erythematous, scarred, and nearly opaque. Bulging with effusion noted. Right TM is more mildly erythematous, somewhat more translucent. No EAC edema or drainage. posterior pharynx with erythema but no exudate, nasal congestion noted   Neck:  Trachea midline. No palpable tender lymphadenopathy. No Kernig's emergency sign. Extremity:   Normal ROM     Heart:  Regular rate and rhythm   Perfusion:  intact  Respiratory:  Lungs clear to auscultation bilaterally. Respirations nonlabored. Abdominal:  Normal bowel sounds. Soft. Nontender. Non distended. Neurological:  Alert and oriented times 3.       I have reviewed and interpreted all of the currently available lab results from this visit (if applicable):  None indicated. Radiographs (if obtained):  Radiologist's Report Reviewed:  None indicated. MDM:  Patient is brought to bed 10 and assessed by me. Plan and medical decision making are discussed with the patient. She is concerned primarily about the pain in her left ear and the possibility of recurrent infection. Physical exam does not suggest mastoiditis, malignant otitis, cavernous sinus thrombosis, peritonsillar or posterior pharyngeal abscess, Ihsan's and, or other septic or systemic process. There is significant TM erythema and opacity, especially asymmetric compared to the other side. We have discussed the possibility of a viral illness, but with the asymmetry, she could benefit from an early course of antibiotics. She does not desire COVID-19 testing. There is no respiratory distress, hypoxia, or cyanosis, and the patient appears clinically lower risk. I estimate there is low risk for, but not limited to, Acute coronary syndrome, pulmonary embolus, aortic dissection, pneumonia requiring admission, sepsis, bacterial meningitis, aortic dissection, or surgical emergency among other considerations. I have encouraged her to follow-up with her ENT specialist in [facility] for long-term management. Patient is nontoxic appearing, appears well hydrated. No indication for imaging here. Patient is tolerating oral intake without difficulty. Patient understands that at this time there is no evidence for another underlying process, however that early in the process of any illness or infection an initial workup/presentation can be falsely reassuring/negative. Based on history, physical exam and discussion with patient, the patient will be treated symptomatically and will be discharged home. Patient was instructed on symptomatic treatment, monitoring and outpatient followup. They understand and agree with the plan, return warnings given.   We have discussed the symptoms which are most concerning that necessitate immediate return. Clinical Impression:  1. Recurrent acute serous otitis media of left ear    2. Otalgia of left ear    3. Acute upper respiratory infection      Disposition referral (if applicable):  Flaquito Conklin LYLE Fonseca CNP  [address]  [phone]    Schedule an appointment as soon as possible for a visit   And your ENT specialist for long-term management    [address]  [phone]  Go to   As needed, If symptoms worsen    Disposition medications (if applicable):  Discharge Medication List as of 4/25/2021  5:03 PM      START taking these medications    Details   doxycycline hyclate (VIBRA-TABS) 100 MG tablet Take 1 tablet by mouth 2 times daily for 10 days May dispense the most affordable formulation of doxycycline available, Disp-20 tablet, R-0Normal      naproxen (NAPROSYN) 500 MG tablet Take 1 tablet by mouth 2 times daily as needed for Pain, Disp-60 tablet, R-0Normal      benzonatate (TESSALON) 200 MG capsule Take 1 capsule by mouth 3 times daily as needed for Cough, Disp-30 capsule, R-0Normal           ED Provider Disposition Time  DISPOSITION Decision To Discharge 04/25/2021 04:56:21 PM      Comment: Please note this report has been produced using speech recognition software and may contain errors related to that system including errors in grammar, punctuation, and spelling, as well as words and phrases that may be inappropriate. Efforts were made to edit the dictations.         Dorie Liao MD  04/25/21 1107

## 2022-11-03 ENCOUNTER — APPOINTMENT (OUTPATIENT)
Dept: GENERAL RADIOLOGY | Age: 32
End: 2022-11-03
Payer: COMMERCIAL

## 2022-11-03 ENCOUNTER — HOSPITAL ENCOUNTER (EMERGENCY)
Age: 32
Discharge: HOME | End: 2022-11-03
Attending: EMERGENCY MEDICINE
Payer: COMMERCIAL

## 2022-11-03 VITALS
SYSTOLIC BLOOD PRESSURE: 140 MMHG | RESPIRATION RATE: 14 BRPM | HEART RATE: 83 BPM | OXYGEN SATURATION: 98 % | DIASTOLIC BLOOD PRESSURE: 96 MMHG | TEMPERATURE: 98.6 F | BODY MASS INDEX: 34.06 KG/M2 | HEIGHT: 67 IN | WEIGHT: 217 LBS

## 2022-11-03 DIAGNOSIS — M79.18 MUSCULOSKELETAL PAIN: ICD-10-CM
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
## 2022-11-03 PROCEDURE — 73130 X-RAY EXAM OF HAND: CPT

## 2022-11-03 PROCEDURE — 73080 X-RAY EXAM OF ELBOW: CPT

## 2022-11-03 PROCEDURE — 73110 X-RAY EXAM OF WRIST: CPT

## 2022-11-03 PROCEDURE — 99283 EMERGENCY DEPT VISIT LOW MDM: CPT

## 2022-11-03 PROCEDURE — 6370000000 HC RX 637 (ALT 250 FOR IP): Performed by: EMERGENCY MEDICINE

## 2022-11-03 PROCEDURE — 73030 X-RAY EXAM OF SHOULDER: CPT

## 2022-11-03 RX ORDER — ACETAMINOPHEN 325 MG/1
650 TABLET ORAL EVERY 6 HOURS PRN
Qty: 120 TABLET | Refills: 3 | Status: SHIPPED | OUTPATIENT
Start: 2022-11-03

## 2022-11-03 RX ORDER — NAPROXEN 500 MG/1
500 TABLET ORAL 2 TIMES DAILY
Qty: 60 TABLET | Refills: 0 | Status: SHIPPED | OUTPATIENT
Start: 2022-11-03

## 2022-11-03 RX ORDER — NAPROXEN 500 MG/1
500 TABLET ORAL ONCE
Status: COMPLETED | OUTPATIENT
Start: 2022-11-03 | End: 2022-11-03

## 2022-11-03 RX ORDER — LIDOCAINE 50 MG/G
1 PATCH TOPICAL DAILY
Qty: 30 PATCH | Refills: 0 | Status: SHIPPED | OUTPATIENT
Start: 2022-11-03 | End: 2022-12-03

## 2022-11-03 RX ADMIN — NAPROXEN 500 MG: 500 TABLET ORAL at 09:37

## 2022-11-03 ASSESSMENT — ENCOUNTER SYMPTOMS
RESPIRATORY NEGATIVE: 1
GASTROINTESTINAL NEGATIVE: 1
EYES NEGATIVE: 1
ALLERGIC/IMMUNOLOGIC NEGATIVE: 1

## 2022-11-03 ASSESSMENT — PAIN SCALES - GENERAL: PAINLEVEL_OUTOF10: 8

## 2022-11-03 ASSESSMENT — PAIN DESCRIPTION - LOCATION: LOCATION: ARM

## 2022-11-03 ASSESSMENT — PAIN DESCRIPTION - ORIENTATION: ORIENTATION: LEFT

## 2022-11-03 ASSESSMENT — PAIN DESCRIPTION - DESCRIPTORS: DESCRIPTORS: DULL

## 2022-11-03 NOTE — Clinical Note
Cayetano Davis was seen and treated in our emergency department on 11/3/2022. She may return to work on 11/04/2022. If you have any questions or concerns, please don't hesitate to call.       Heron Cid, DO

## 2022-11-03 NOTE — ED NOTES
Discharge instructions and prescriptions given, pt voiced understanding. Ambulatory to exit without incident.       Cora Troncoso RN  11/03/22 1038

## 2022-11-03 NOTE — ED PROVIDER NOTES
[facility] [place]      TRIAGE CHIEF COMPLAINT:   Fall (LEFT ARM AND RT THUMB PAIN)      [place] Dee-ni':  Jerrod Lizarraga is a 34 y.o. female that presents with complaint of right wrist hand pain left shoulder pain left elbow pain after fall. Patient states a couple days ago her dog tripped her and she fell. No other injuries or questions or concerns she has been taking ibuprofen without relief. Denies other questions or concerns she is right-handed. No chest pain or shortness of breath abdominal pain no head neck or back pain no loss of consciousness no other extremity pain. Denies pregnancy. Pink Chu REVIEW OF SYSTEMS:  At least 10 systems reviewed and otherwise acutely negative except as in the [address] Review of Systems   Constitutional: Negative. HENT: Negative. Eyes: Negative. Respiratory: Negative. Cardiovascular: Negative. Gastrointestinal: Negative. Endocrine: Negative. Genitourinary: Negative. Musculoskeletal:  Positive for arthralgias. Skin: Negative. Allergic/Immunologic: Negative. Neurological: Negative. Hematological: Negative. Psychiatric/Behavioral: Negative. All other systems reviewed and are negative.     Past Medical History:   Diagnosis Date    Gestational diabetes      Past Surgical History:   Procedure Laterality Date    TUBAL LIGATION      TYMPANOSTOMY TUBE PLACEMENT      x 7     Family History   Problem Relation Age of Onset    High Blood Pressure Mother     High Cholesterol Mother     Hypothyroidism Mother     Other Mother         gerd    High Blood Pressure Father     Diabetes Father         pre-DM     Social History     Socioeconomic History    Marital status: Single     Spouse name: Not on file    Number of children: Not on file    Years of education: Not on file    Highest education level: Not on file   Occupational History    Not on file   Tobacco Use    Smoking status: Never    Smokeless tobacco: Never   Vaping Use    Vaping Use: Never used   Substance and Sexual Activity    Alcohol use: Not Currently     Comment: weekly in the winter, rare rest of the year. Drug use: No    Sexual activity: Not on file   Other Topics Concern    Not on file   Social History Narrative    Not on file     Social Determinants of Health     Financial Resource Strain: Not on file   Food Insecurity: Not on file   Transportation Needs: Not on file   Physical Activity: Not on file   Stress: Not on file   Social Connections: Not on file   Intimate Partner Violence: Not on file   Housing Stability: Not on file     No current facility-administered medications for this encounter. Current Outpatient Medications   Medication Sig Dispense Refill    naproxen (NAPROSYN) 500 MG tablet Take 1 tablet by mouth 2 times daily 60 tablet 0    acetaminophen (TYLENOL) 325 MG tablet Take 2 tablets by mouth every 6 hours as needed for Pain 120 tablet 3    lidocaine (LIDODERM) 5 % Place 1 patch onto the skin daily 12 hours on, 12 hours off. 30 patch 0    levonorgestrel (MIRENA, 52 MG,) IUD 52 mg 1 each by Intrauterine route once        No Known Allergies  No current facility-administered medications for this encounter. Current Outpatient Medications   Medication Sig Dispense Refill    naproxen (NAPROSYN) 500 MG tablet Take 1 tablet by mouth 2 times daily 60 tablet 0    acetaminophen (TYLENOL) 325 MG tablet Take 2 tablets by mouth every 6 hours as needed for Pain 120 tablet 3    lidocaine (LIDODERM) 5 % Place 1 patch onto the skin daily 12 hours on, 12 hours off. 30 patch 0    levonorgestrel (MIRENA, 52 MG,) IUD 52 mg 1 each by Intrauterine route once         Nursing Notes Reviewed    VITAL SIGNS:  ED Triage Vitals   Enc Vitals Group      BP       Pulse       Resp       Temp       Temp src       SpO2       Weight       Height       Head Circumference       Peak Flow       Pain Score       Pain Loc       Pain Edu? Excl. in [address]?         PHYSICAL EXAM:  Physical Exam  Vitals and nursing note reviewed. Constitutional:       General: She is not in acute distress. Appearance: Normal appearance. She is not ill-appearing, toxic-appearing or diaphoretic. HENT:      Head: Normocephalic and atraumatic. Right Ear: External ear normal.      Left Ear: External ear normal.   Eyes:      General: No scleral icterus. Right eye: No discharge. Left eye: No discharge. Extraocular Movements: Extraocular movements intact. Conjunctiva/sclera: Conjunctivae normal.   Cardiovascular:      Rate and Rhythm: Normal rate and regular rhythm. Pulses: Normal pulses. Heart sounds: Normal heart sounds. No murmur heard. No friction rub. No gallop. Pulmonary:      Effort: Pulmonary effort is normal. No respiratory distress. Breath sounds: Normal breath sounds. No stridor. No wheezing, rhonchi or rales. Abdominal:      General: Bowel sounds are normal. There is no distension. Palpations: Abdomen is soft. Tenderness: There is no abdominal tenderness. There is no guarding or rebound. Hernia: No hernia is present. Musculoskeletal:         General: Tenderness present. No swelling, deformity or signs of injury. Right shoulder: Normal.      Left shoulder: Tenderness present. Decreased range of motion. Right upper arm: Normal.      Left upper arm: Normal.      Right elbow: Normal.      Left elbow: Tenderness present. Right forearm: Normal.      Left forearm: Normal.      Right wrist: Tenderness present. No snuff box tenderness. Left wrist: Normal.      Right hand: Tenderness present. Normal pulse. Left hand: Normal.      Cervical back: Normal and normal range of motion. No rigidity or tenderness. Thoracic back: Normal.      Lumbar back: Normal.      Right lower leg: No edema. Left lower leg: No edema. Lymphadenopathy:      Cervical: No cervical adenopathy. Skin:     General: Skin is warm.       Coloration: Skin is not jaundiced or pale. Findings: Bruising present. No erythema, lesion or rash. Neurological:      General: No focal deficit present. Mental Status: She is alert and oriented to person, place, and time. GCS: GCS eye subscore is 4. GCS verbal subscore is 5. GCS motor subscore is 6. Cranial Nerves: No cranial nerve deficit. Sensory: No sensory deficit. Motor: No weakness. Coordination: Coordination normal.   Psychiatric:         Mood and Affect: Mood normal.         Behavior: Behavior normal.         Thought Content: Thought content normal.         Judgment: Judgment normal.         I have reviewed andinterpreted all of the currently available lab results from this visit (if applicable):    No results found for this visit on 11/03/22. Radiographs (if obtained):  [] The following radiograph was interpreted by myself in the absence of a radiologist:  [x] Radiologist's Report Reviewed:    X-ray left shoulder, left elbow, right hand, right wrist    EKG (if obtained): (All EKG's are interpreted by myself in the absence of a cardiologist)    MDM:    Patient with complaint of fall, left elbow or left shoulder right hand wrist pain. Patient states a couple days ago she tripped and fell while walking her dog. She fell and injured her left shoulder left elbow, right hand right wrist.  She is right-handed. She did not pass out did not hit her head no head neck back pain other extremity pain. No chest pain shortness of breath abdominal pain no other weakness observed tingling. On arrival she appears well vital signs are stable she is taking ibuprofen home relief. She is good pulses good sensation compartments are soft she has decreased range of motion of the shoulder no obvious signs of trauma she has bruising to right thumb. X-rays performed of all areas that hurt and negative for fracture dislocation. Vital signs are stable.   I do not think she needs labs imaging or emergent transfer. No signs of compartment syndrome, or ischemia. No signs of infection. Patient stable discharged home given medications return precautions and follow-up information    CLINICAL IMPRESSION:  Final diagnoses:   Fall, initial encounter   Musculoskeletal pain       (Please note that portions of this note may have been completed with a voice recognition program. Efforts were made to edit the dictations but occasionally words aremis-transcribed.)    DISPOSITION REFERRAL (if applicable):  LYLE Flores - CNP  [address]  [phone]    Schedule an appointment as soon as possible for a visit in 1 day      [address] [id number]  [address]  [phone]    If symptoms worsen    DISPOSITION MEDICATIONS (if applicable):  New Prescriptions    ACETAMINOPHEN (TYLENOL) 325 MG TABLET    Take 2 tablets by mouth every 6 hours as needed for Pain    LIDOCAINE (LIDODERM) 5 %    Place 1 patch onto the skin daily 12 hours on, 12 hours off.     NAPROXEN (NAPROSYN) 500 MG TABLET    Take 1 tablet by mouth 2 times daily          DO Ana Florence DO  11/03/22 [id number]

## 2022-12-22 ENCOUNTER — HOSPITAL ENCOUNTER (EMERGENCY)
Age: 32
Discharge: HOME | End: 2022-12-22
Attending: EMERGENCY MEDICINE
Payer: COMMERCIAL

## 2022-12-22 ENCOUNTER — APPOINTMENT (OUTPATIENT)
Dept: GENERAL RADIOLOGY | Age: 32
End: 2022-12-22
Payer: COMMERCIAL

## 2022-12-22 VITALS
RESPIRATION RATE: 15 BRPM | WEIGHT: 216 LBS | DIASTOLIC BLOOD PRESSURE: 96 MMHG | HEIGHT: 67 IN | SYSTOLIC BLOOD PRESSURE: 155 MMHG | OXYGEN SATURATION: 100 % | BODY MASS INDEX: 33.9 KG/M2 | TEMPERATURE: 97.6 F | HEART RATE: 96 BPM

## 2022-12-22 DIAGNOSIS — S93.601A SPRAIN OF RIGHT FOOT, INITIAL ENCOUNTER: ICD-10-CM

## 2022-12-22 DIAGNOSIS — S93.401A SPRAIN OF RIGHT ANKLE, UNSPECIFIED LIGAMENT, INITIAL ENCOUNTER: Primary | ICD-10-CM

## 2022-12-22 PROCEDURE — 96372 THER/PROPH/DIAG INJ SC/IM: CPT

## 2022-12-22 PROCEDURE — 99284 EMERGENCY DEPT VISIT MOD MDM: CPT

## 2022-12-22 PROCEDURE — 73610 X-RAY EXAM OF ANKLE: CPT

## 2022-12-22 PROCEDURE — 6360000002 HC RX W HCPCS: Performed by: EMERGENCY MEDICINE

## 2022-12-22 PROCEDURE — 73590 X-RAY EXAM OF LOWER LEG: CPT

## 2022-12-22 RX ORDER — NAPROXEN 500 MG/1
500 TABLET ORAL 2 TIMES DAILY
Qty: 60 TABLET | Refills: 0 | Status: SHIPPED | OUTPATIENT
Start: 2022-12-22

## 2022-12-22 RX ORDER — KETOROLAC TROMETHAMINE 30 MG/ML
30 INJECTION, SOLUTION INTRAMUSCULAR; INTRAVENOUS ONCE
Status: COMPLETED | OUTPATIENT
Start: 2022-12-22 | End: 2022-12-22

## 2022-12-22 RX ADMIN — KETOROLAC TROMETHAMINE 30 MG: 30 INJECTION, SOLUTION INTRAMUSCULAR; INTRAVENOUS at 16:54

## 2022-12-22 ASSESSMENT — PAIN SCALES - GENERAL: PAINLEVEL_OUTOF10: 9

## 2022-12-22 ASSESSMENT — PAIN DESCRIPTION - LOCATION: LOCATION: ANKLE;FOOT

## 2022-12-22 ASSESSMENT — PAIN - FUNCTIONAL ASSESSMENT: PAIN_FUNCTIONAL_ASSESSMENT: 0-10

## 2022-12-22 ASSESSMENT — PAIN DESCRIPTION - ORIENTATION: ORIENTATION: RIGHT

## 2022-12-22 NOTE — Clinical Note
Ny Lara was seen and treated in our emergency department on 12/22/2022. She may return to work on 12/26/2022. If you have any questions or concerns, please don't hesitate to call.       Jill Brantley MD

## 2022-12-22 NOTE — ED PROVIDER NOTES
[address] [facility] ENCOUNTER      Pt Name: Celso Zamudio  MRN: [id number]  [place] 1990  Date of evaluation: 12/22/2022  Provider: Blake Santiago MD  Insurance:  Payor: [facility] / Plan: Danny Chamberlain / Product Type: *No Product type* /   Current Code Status   Code Status: Not on file     [address]       Chief Complaint   Patient presents with    Ankle Pain    Foot Pain     Reports stepped wrong on it 1.5 week ago         HISTORY OF PRESENT ILLNESS    HPI    Nursing Notes were reviewed. This is a 34 y.o. female who presents to the emergency department with right ankle pain for approximately 10 to 12 days. Patient says that she stepped off of a curb and developed sudden pain on the lateral aspect of her right foot and right ankle. The patient has been ambulating on the foot for the past 10 to 11 days since the injury, and comes to the emergency department today with increasing pain in her foot radiating up her right leg. The patient says that she still has pain when she ambulates and that the pain has been worsening over the past few days. She denies any leg swelling. She denies any other injury. REVIEW OF SYSTEMS       Review of Systems    10 Systems were reviewed with this patient and all were negative with exception of those noted in the history of present illness above.       PAST MEDICAL HISTORY     Past Medical History:   Diagnosis Date    Gestational diabetes          SURGICAL HISTORY       Past Surgical History:   Procedure Laterality Date    TUBAL LIGATION      TYMPANOSTOMY TUBE PLACEMENT      x 7         CURRENT MEDICATIONS       Current Discharge Medication List        CONTINUE these medications which have NOT CHANGED    Details   acetaminophen (TYLENOL) 325 MG tablet Take 2 tablets by mouth every 6 hours as needed for Pain  Qty: 120 tablet, Refills: 3      levonorgestrel (MIRENA, 52 MG,) IUD 52 mg 1 each by Intrauterine route once ALLERGIES     Patient has no known allergies. FAMILY HISTORY       Family History   Problem Relation Age of Onset    High Blood Pressure Mother     High Cholesterol Mother     Hypothyroidism Mother     Other Mother         gerd    High Blood Pressure Father     Diabetes Father         pre-DM          SOCIAL HISTORY       Social History     Socioeconomic History    Marital status: Single     Spouse name: None    Number of children: None    Years of education: None    Highest education level: None   Tobacco Use    Smoking status: Never    Smokeless tobacco: Never   Vaping Use    Vaping Use: Never used   Substance and Sexual Activity    Alcohol use: Not Currently     Comment: weekly in the winter, rare rest of the year. Drug use: No       PHYSICAL EXAM       ED Triage Vitals [12/22/22 1645]   BP Temp Temp src Heart Rate Resp SpO2 Height Weight   (!) 155/96 97.6 °F (36.4 °C) -- 96 15 100 % 5' 7\" (1.702 m) 216 lb (98 kg)       Physical Exam  Vitals and nursing note reviewed. Constitutional:       Appearance: Normal appearance. HENT:      Head: Normocephalic and atraumatic. Eyes:      Extraocular Movements: Extraocular movements intact. Cardiovascular:      Rate and Rhythm: Normal rate. Pulmonary:      Effort: Pulmonary effort is normal.   Musculoskeletal:      Right ankle: No swelling or lacerations. Tenderness present. Decreased range of motion. Right Achilles Tendon: Tenderness present. Cuellar's test negative. Right foot: Decreased range of motion. No deformity or bunion. Feet:    Feet:      Right foot:      Skin integrity: Skin integrity normal.   Skin:     General: Skin is warm and dry. Capillary Refill: Capillary refill takes less than 2 seconds. Neurological:      General: No focal deficit present. Mental Status: She is alert and oriented to person, place, and time.    Psychiatric:         Mood and Affect: Mood normal.         Behavior: Behavior normal. Vitals:    Vitals:    12/22/22 1645   BP: (!) 155/96   Pulse: 96   Resp: 15   Temp: 97.6 °F (36.4 °C)   SpO2: 100%   Weight: 216 lb (98 kg)   Height: 5' 7\" (1.702 m)       DIAGNOSTIC RESULTS     RADIOLOGY:   Non-plain film images such as CT, Ultrasound and MRI are read by the radiologist. Plain radiographic images are visualized and preliminarily interpreted by the emergency physician with the below findings:    Interpretation per the Radiologist below, if available at the time of this note:    XR TIBIA FIBULA RIGHT (2 VIEWS)   Final Result   1. No acute osseous abnormality of the right tibia and fibula or right ankle   identified. 2. No significant degenerative change. XR ANKLE RIGHT (MIN 3 VIEWS)   Final Result   1. No acute osseous abnormality of the right tibia and fibula or right ankle   identified. 2. No significant degenerative change. LABS:  Labs Reviewed - No data to display    All other labs were within normal range or not returned as of this dictation. MEDICAL DECISION MAKING     Medications   ketorolac (TORADOL) injection 30 mg (30 mg IntraMUSCular Given 12/22/22 1654)                                   CIWA Assessment  BP: (!) 155/96  Heart Rate: 96                 MDM  Medical signs and symptoms are consistent with right ankle sprain. There is no gross bony tenderness. However, the patient symptoms have worsened over the past week rather than improved. However, she has not engaged in any significant elevation, rest, or icing of the ankle since the event. X-ray of the ankle does not demonstrate any fracture or bony injury. The patient has been placed in an ankle brace and we discharged home with instructions for rest, ice, elevation, and been given contact information for orthopedics for follow-up as needed    Clinical Diagnoses Addressed  1. Sprain of right ankle, unspecified ligament, initial encounter    2.  Sprain of right foot, initial encounter CONSULTS:  None    PROCEDURES:  Unless otherwise noted below, none     Procedures      FINAL IMPRESSION      1. Sprain of right ankle, unspecified ligament, initial encounter    2. Sprain of right foot, initial encounter          DISPOSITION/PLAN   DISPOSITION Decision To Discharge 12/22/2022 06:21:54 PM      PATIENT REFERRED TO:  LYLE Jon CNP 7342  [phone]    Call in 1 day      [address] [facility]  [phone]    Call in 1 day      DISCHARGE MEDICATIONS:  Current Discharge Medication List        Controlled Substances Monitoring:     No flowsheet data found.     Vamsi Mariee MD (electronically signed)  Attending Emergency Physician            Vamsi Mariee MD  12/22/22 3721

## 2023-01-03 ENCOUNTER — OFFICE VISIT (OUTPATIENT)
Dept: FAMILY MEDICINE CLINIC | Age: 33
End: 2023-01-03
Payer: COMMERCIAL

## 2023-01-03 VITALS
OXYGEN SATURATION: 98 % | TEMPERATURE: 97.6 F | DIASTOLIC BLOOD PRESSURE: 76 MMHG | WEIGHT: 219 LBS | BODY MASS INDEX: 34.3 KG/M2 | RESPIRATION RATE: 16 BRPM | SYSTOLIC BLOOD PRESSURE: 124 MMHG | HEART RATE: 88 BPM

## 2023-01-03 DIAGNOSIS — M79.674 PAIN AROUND TOENAIL, RIGHT FOOT: ICD-10-CM

## 2023-01-03 DIAGNOSIS — R51.9 HEADACHE DISORDER: ICD-10-CM

## 2023-01-03 DIAGNOSIS — R63.5 WEIGHT GAIN: ICD-10-CM

## 2023-01-03 DIAGNOSIS — Z13.6 SCREENING FOR CARDIOVASCULAR CONDITION: ICD-10-CM

## 2023-01-03 DIAGNOSIS — F32.A DEPRESSION, UNSPECIFIED DEPRESSION TYPE: Primary | ICD-10-CM

## 2023-01-03 LAB
A/G RATIO: 1.5 (ref 1.1–2.2)
ALBUMIN SERPL-MCNC: 4.2 G/DL (ref 3.4–5)
ALP BLD-CCNC: 95 U/L (ref 40–129)
ALT SERPL-CCNC: 42 U/L (ref 10–40)
ANION GAP SERPL CALCULATED.3IONS-SCNC: 14 MMOL/L (ref 3–16)
AST SERPL-CCNC: 24 U/L (ref 15–37)
BASOPHILS ABSOLUTE: 0.1 K/UL (ref 0–0.2)
BASOPHILS RELATIVE PERCENT: 1.3 %
BILIRUB SERPL-MCNC: <0.2 MG/DL (ref 0–1)
BUN BLDV-MCNC: 10 MG/DL (ref 7–20)
CALCIUM SERPL-MCNC: 9.3 MG/DL (ref 8.3–10.6)
CHLORIDE BLD-SCNC: 100 MMOL/L (ref 99–110)
CHOLESTEROL, TOTAL: 246 MG/DL (ref 0–199)
CO2: 23 MMOL/L (ref 21–32)
CREAT SERPL-MCNC: <0.5 MG/DL (ref 0.6–1.1)
EOSINOPHILS ABSOLUTE: 0.2 K/UL (ref 0–0.6)
EOSINOPHILS RELATIVE PERCENT: 3.2 %
GFR SERPL CREATININE-BSD FRML MDRD: >60 ML/MIN/{1.73_M2}
GLUCOSE BLD-MCNC: 87 MG/DL (ref 70–99)
HCT VFR BLD CALC: 42.5 % (ref 36–48)
HDLC SERPL-MCNC: 32 MG/DL (ref 40–60)
HEMOGLOBIN: 14.3 G/DL (ref 12–16)
LDL CHOLESTEROL CALCULATED: ABNORMAL MG/DL
LDL CHOLESTEROL DIRECT: 124 MG/DL
LYMPHOCYTES ABSOLUTE: 2.4 K/UL (ref 1–5.1)
LYMPHOCYTES RELATIVE PERCENT: 33.3 %
MCH RBC QN AUTO: 31 PG (ref 26–34)
MCHC RBC AUTO-ENTMCNC: 33.7 G/DL (ref 31–36)
MCV RBC AUTO: 92 FL (ref 80–100)
MONOCYTES ABSOLUTE: 0.4 K/UL (ref 0–1.3)
MONOCYTES RELATIVE PERCENT: 5.1 %
NEUTROPHILS ABSOLUTE: 4.1 K/UL (ref 1.7–7.7)
NEUTROPHILS RELATIVE PERCENT: 57.1 %
PDW BLD-RTO: 12.8 % (ref 12.4–15.4)
PLATELET # BLD: 279 K/UL (ref 135–450)
PMV BLD AUTO: 8.6 FL (ref 5–10.5)
POTASSIUM SERPL-SCNC: 4.1 MMOL/L (ref 3.5–5.1)
RBC # BLD: 4.62 M/UL (ref 4–5.2)
SODIUM BLD-SCNC: 137 MMOL/L (ref 136–145)
TOTAL PROTEIN: 7 G/DL (ref 6.4–8.2)
TRIGL SERPL-MCNC: 373 MG/DL (ref 0–150)
TSH SERPL DL<=0.05 MIU/L-ACNC: 1.76 UIU/ML (ref 0.27–4.2)
VLDLC SERPL CALC-MCNC: ABNORMAL MG/DL
WBC # BLD: 7.1 K/UL (ref 4–11)

## 2023-01-03 PROCEDURE — 1036F TOBACCO NON-USER: CPT | Performed by: NURSE PRACTITIONER

## 2023-01-03 PROCEDURE — 99213 OFFICE O/P EST LOW 20 MIN: CPT | Performed by: NURSE PRACTITIONER

## 2023-01-03 PROCEDURE — G8417 CALC BMI ABV UP PARAM F/U: HCPCS | Performed by: NURSE PRACTITIONER

## 2023-01-03 PROCEDURE — G8427 DOCREV CUR MEDS BY ELIG CLIN: HCPCS | Performed by: NURSE PRACTITIONER

## 2023-01-03 PROCEDURE — G8484 FLU IMMUNIZE NO ADMIN: HCPCS | Performed by: NURSE PRACTITIONER

## 2023-01-03 RX ORDER — LEVONORGESTREL 52 MG/1
INTRAUTERINE DEVICE INTRAUTERINE
COMMUNITY

## 2023-01-03 RX ORDER — FLUOXETINE 10 MG/1
10 CAPSULE ORAL DAILY
Qty: 30 CAPSULE | Refills: 3 | Status: SHIPPED | OUTPATIENT
Start: 2023-01-03

## 2023-01-03 SDOH — ECONOMIC STABILITY: FOOD INSECURITY: WITHIN THE PAST 12 MONTHS, THE FOOD YOU BOUGHT JUST DIDN'T LAST AND YOU DIDN'T HAVE MONEY TO GET MORE.: NEVER TRUE

## 2023-01-03 SDOH — ECONOMIC STABILITY: FOOD INSECURITY: WITHIN THE PAST 12 MONTHS, YOU WORRIED THAT YOUR FOOD WOULD RUN OUT BEFORE YOU GOT MONEY TO BUY MORE.: NEVER TRUE

## 2023-01-03 ASSESSMENT — SOCIAL DETERMINANTS OF HEALTH (SDOH): HOW HARD IS IT FOR YOU TO PAY FOR THE VERY BASICS LIKE FOOD, HOUSING, MEDICAL CARE, AND HEATING?: NOT HARD AT ALL

## 2023-01-03 ASSESSMENT — PATIENT HEALTH QUESTIONNAIRE - PHQ9
5. POOR APPETITE OR OVEREATING: 1
6. FEELING BAD ABOUT YOURSELF - OR THAT YOU ARE A FAILURE OR HAVE LET YOURSELF OR YOUR FAMILY DOWN: 2
7. TROUBLE CONCENTRATING ON THINGS, SUCH AS READING THE NEWSPAPER OR WATCHING TELEVISION: 2
2. FEELING DOWN, DEPRESSED OR HOPELESS: 2
SUM OF ALL RESPONSES TO PHQ QUESTIONS 1-9: 15
9. THOUGHTS THAT YOU WOULD BE BETTER OFF DEAD, OR OF HURTING YOURSELF: 0
SUM OF ALL RESPONSES TO PHQ QUESTIONS 1-9: 15
1. LITTLE INTEREST OR PLEASURE IN DOING THINGS: 2
SUM OF ALL RESPONSES TO PHQ QUESTIONS 1-9: 15
3. TROUBLE FALLING OR STAYING ASLEEP: 3
10. IF YOU CHECKED OFF ANY PROBLEMS, HOW DIFFICULT HAVE THESE PROBLEMS MADE IT FOR YOU TO DO YOUR WORK, TAKE CARE OF THINGS AT HOME, OR GET ALONG WITH OTHER PEOPLE: 1
SUM OF ALL RESPONSES TO PHQ9 QUESTIONS 1 & 2: 4
4. FEELING TIRED OR HAVING LITTLE ENERGY: 1
SUM OF ALL RESPONSES TO PHQ QUESTIONS 1-9: 15
8. MOVING OR SPEAKING SO SLOWLY THAT OTHER PEOPLE COULD HAVE NOTICED. OR THE OPPOSITE, BEING SO FIGETY OR RESTLESS THAT YOU HAVE BEEN MOVING AROUND A LOT MORE THAN USUAL: 2

## 2023-01-03 ASSESSMENT — ENCOUNTER SYMPTOMS
SHORTNESS OF BREATH: 0
WHEEZING: 0
CHEST TIGHTNESS: 0
TROUBLE SWALLOWING: 0
COUGH: 0
GASTROINTESTINAL NEGATIVE: 1
SORE THROAT: 0

## 2023-01-03 NOTE — PROGRESS NOTES
Nathalie ROBBIN Moser  1990  34 y.o. SUBJECT CASIE:    Chief Complaint   Patient presents with    Depression     Need to restart medication. Headache     The crown portion of the head. Racquel Griffith is a 34year old female who is in for follow-up on depression and headache. She states that the Zoloft did not really help much and she would like to try something different. She describes having severe mood swings and she also demonstrates some symptoms of obsessive compulsive behavior. She also complains of sleep disorder, fatigue, irritability, low energy, and poor concentration. Her PHQ score today is 15. She states in the past that she did have counseling but did not like this and does not want to engage in counseling anytime soon. She states her symptoms started after the birth of her son who is now 10years old. She had the Mirena placed about 4 years ago and I mentioned that this could also be a problem with her depression. She states her GYN agrees but that this is the only contraceptive medication that is helping her menstrual disorders. She also complains of an occasional headache but states it is not something that is migraine type and that an occasional ibuprofen takes it away. DepressionPatient presents with the following symptoms: decreased concentration. Patient is not experiencing: palpitations and shortness of breath. Toe Pain  She complains of painful right great toe for the last year. She states one year ago something heavy fell on her right great toe and has been a problem with the toenail since. She states she wears steel toe boots daily for work and the rubbing of the boot againts the toenail causes a great deal of pain.     Current Outpatient Medications on File Prior to Visit   Medication Sig Dispense Refill    levonorgestrel (MIRENA, 52 MG,) IUD 52 mg       naproxen (NAPROSYN) 500 MG tablet Take 1 tablet by mouth 2 times daily 60 tablet 0    acetaminophen (TYLENOL) 325 MG tablet Take 2 tablets by mouth every 6 hours as needed for Pain 120 tablet 3    levonorgestrel (MIRENA) IUD 52 mg 1 each by Intrauterine route once      [DISCONTINUED] ibuprofen (ADVIL) 200 MG tablet Take 3 tablets by mouth every 8 hours as needed for Pain 60 tablet 0     No current facility-administered medications on file prior to visit.       Past Medical History:   Diagnosis Date    Gestational diabetes      Past Surgical History:   Procedure Laterality Date    TUBAL LIGATION      TYMPANOSTOMY TUBE PLACEMENT      x 7     Family History   Problem Relation Age of Onset    Asthma Mother     Diabetes Mother     High Blood Pressure Mother     High Cholesterol Mother     Hypothyroidism Mother     Other Mother         gerd    High Blood Pressure Father     Diabetes Father         pre-DM     Social History     Socioeconomic History    Marital status: Single     Spouse name: Not on file    Number of children: Not on file    Years of education: Not on file    Highest education level: Not on file   Occupational History    Not on file   Tobacco Use    Smoking status: Never    Smokeless tobacco: Never   Vaping Use    Vaping Use: Never used   Substance and Sexual Activity    Alcohol use: Not Currently     Comment: weekly in the winter, rare rest of the year.     Drug use: No    Sexual activity: Not on file   Other Topics Concern    Not on file   Social History Narrative    Not on file     Social Determinants of Health     Financial Resource Strain: Low Risk     Difficulty of Paying Living Expenses: Not hard at all   Food Insecurity: No Food Insecurity    Worried About Running Out of Food in the Last Year: Never true    Ran Out of Food in the Last Year: Never true   Transportation Needs: Not on file   Physical Activity: Not on file   Stress: Not on file   Social Connections: Not on file   Intimate Partner Violence: Not on file   Housing Stability: Not on file       Review of Systems   Constitutional:  Positive for fatigue.  Negative for activity change, appetite change, chills, diaphoresis, fever and unexpected weight change. HENT:  Negative for sore throat and trouble swallowing. Respiratory:  Negative for cough, chest tightness, shortness of breath and wheezing. Cardiovascular:  Negative for chest pain and palpitations. Gastrointestinal: Negative. Neurological:  Positive for headaches. Negative for dizziness, tremors and weakness. Psychiatric/Behavioral:  Positive for decreased concentration, depression, dysphoric mood and sleep disturbance. The patient is hyperactive. OBJECTIVE:     /76 (Site: Right Upper Arm, Position: Sitting, Cuff Size: Large Adult)   Pulse 88   Temp 97.6 °F (36.4 °C) (Infrared)   Resp 16   Wt 219 lb (99.3 kg)   LMP 01/03/2023 (Exact Date)   SpO2 98%   BMI 34.30 kg/m²     Physical Exam  Vitals reviewed. Constitutional:       General: She is not in acute distress. Appearance: Normal appearance. She is well-developed. She is obese. She is not ill-appearing or diaphoretic. HENT:      Head: Normocephalic and atraumatic. Right Ear: External ear normal.      Left Ear: External ear normal.      Nose: Nose normal.   Eyes:      Conjunctiva/sclera: Conjunctivae normal.      Pupils: Pupils are equal, round, and reactive to light. Cardiovascular:      Rate and Rhythm: Normal rate and regular rhythm. Heart sounds: Normal heart sounds. No murmur heard. No friction rub. No gallop. Pulmonary:      Effort: Pulmonary effort is normal. No respiratory distress. Breath sounds: Normal breath sounds. No wheezing. Chest:      Chest wall: No tenderness. Abdominal:      Palpations: Abdomen is soft. Musculoskeletal:         General: Normal range of motion. Cervical back: Normal range of motion and neck supple. Feet:    Skin:     General: Skin is warm and dry. Neurological:      Mental Status: She is alert and oriented to person, place, and time.       Motor: No weakness. Gait: Gait normal.   Psychiatric:         Behavior: Behavior normal.         Thought Content: Thought content normal.         Judgment: Judgment normal.       No results found for requested labs within last 30 days. Hemoglobin A1C (%)   Date Value   03/02/2020 5.1       Lab Results   Component Value Date/Time    WBC 8.6 03/02/2020 03:36 PM    WBC 9.6 05/11/2015 09:15 PM    NEUTROABS 5.9 03/02/2020 03:36 PM    HGB 14.5 03/02/2020 03:36 PM    HGB 13.6 05/11/2015 09:15 PM    HCT 43.4 03/02/2020 03:36 PM    HCT 39.8 05/11/2015 09:15 PM    MCV 90.7 03/02/2020 03:36 PM    MCV 90.7 05/11/2015 09:15 PM     03/02/2020 03:36 PM     05/11/2015 09:15 PM    SEGSABS 6.6 05/11/2015 09:15 PM    LYMPHSABS 2.1 03/02/2020 03:36 PM    MONOSABS 0.4 03/02/2020 03:36 PM    EOSABS 0.1 03/02/2020 03:36 PM    BASOSABS 0.1 03/02/2020 03:36 PM     No results found for: TSH, TSHHS  Lab Results   Component Value Date/Time    LABALBU 4.3 03/02/2020 03:36 PM    BILITOT 0.3 03/02/2020 03:36 PM    AST 21 03/02/2020 03:36 PM    ALT 41 03/02/2020 03:36 PM    ALKPHOS 82 03/02/2020 03:36 PM             No results found for this visit on 01/03/23. ASSESSMENT AND PLAN:     1. Depression, unspecified depression type  - will start different anti-depressant  - FLUoxetine (PROZAC) 10 MG capsule; Take 1 capsule by mouth daily  Dispense: 30 capsule; Refill: 3  - CBC with Auto Differential  - TSH    2. Weight gain    3. Headache disorder  - CBC with Auto Differential  - Comprehensive Metabolic Panel  - TSH    4. Screening for cardiovascular condition  - Lipid Panel    5. Pain around toenail, right foot  - Amb External Referral To Podiatry    Start new anti-depressant. Referred to podiatry for toenail issue. Will call results of lab work. Verbalized understanding and agreement with plan. Return in about 6 weeks (around 2/14/2023) for Medication Re-Evaluation, depression. Care discussed with patient.  Patient educated on signs and symptoms of exacerbation and when to seek further medical attention. Advised to call for any problems, questions, or concerns. Patient verbalizes understanding and agrees with plan. Medications reviewed and reconciled. Continue current medications. Appropriate prescriptions are ordered. Risks and benefits of meds are discussed. After visit summary provided.

## 2023-01-22 DIAGNOSIS — E78.00 MILD HYPERCHOLESTEROLEMIA: Primary | ICD-10-CM

## 2023-01-22 RX ORDER — ATORVASTATIN CALCIUM 20 MG/1
20 TABLET, FILM COATED ORAL DAILY
Qty: 90 TABLET | Refills: 1 | Status: SHIPPED | OUTPATIENT
Start: 2023-01-22

## 2023-02-21 ENCOUNTER — OFFICE VISIT (OUTPATIENT)
Dept: FAMILY MEDICINE CLINIC | Age: 33
End: 2023-02-21
Payer: COMMERCIAL

## 2023-02-21 VITALS
OXYGEN SATURATION: 98 % | BODY MASS INDEX: 34.14 KG/M2 | TEMPERATURE: 98.4 F | SYSTOLIC BLOOD PRESSURE: 126 MMHG | HEART RATE: 74 BPM | RESPIRATION RATE: 18 BRPM | WEIGHT: 218 LBS | DIASTOLIC BLOOD PRESSURE: 74 MMHG

## 2023-02-21 DIAGNOSIS — R06.83 SNORING: ICD-10-CM

## 2023-02-21 DIAGNOSIS — F32.A DEPRESSION, UNSPECIFIED DEPRESSION TYPE: Primary | ICD-10-CM

## 2023-02-21 DIAGNOSIS — G47.9 SLEEP DISTURBANCE: ICD-10-CM

## 2023-02-21 PROCEDURE — G8417 CALC BMI ABV UP PARAM F/U: HCPCS | Performed by: NURSE PRACTITIONER

## 2023-02-21 PROCEDURE — G8427 DOCREV CUR MEDS BY ELIG CLIN: HCPCS | Performed by: NURSE PRACTITIONER

## 2023-02-21 PROCEDURE — 99214 OFFICE O/P EST MOD 30 MIN: CPT | Performed by: NURSE PRACTITIONER

## 2023-02-21 PROCEDURE — G8484 FLU IMMUNIZE NO ADMIN: HCPCS | Performed by: NURSE PRACTITIONER

## 2023-02-21 PROCEDURE — 1036F TOBACCO NON-USER: CPT | Performed by: NURSE PRACTITIONER

## 2023-02-21 RX ORDER — FLUOXETINE HYDROCHLORIDE 20 MG/1
20 CAPSULE ORAL DAILY
Qty: 30 CAPSULE | Refills: 2 | Status: SHIPPED | OUTPATIENT
Start: 2023-02-21

## 2023-02-21 RX ORDER — HYDROXYZINE HYDROCHLORIDE 25 MG/1
25 TABLET, FILM COATED ORAL NIGHTLY
Qty: 30 TABLET | Refills: 2 | Status: SHIPPED | OUTPATIENT
Start: 2023-02-21 | End: 2023-03-23

## 2023-02-21 SDOH — ECONOMIC STABILITY: INCOME INSECURITY: HOW HARD IS IT FOR YOU TO PAY FOR THE VERY BASICS LIKE FOOD, HOUSING, MEDICAL CARE, AND HEATING?: NOT HARD AT ALL

## 2023-02-21 SDOH — ECONOMIC STABILITY: FOOD INSECURITY: WITHIN THE PAST 12 MONTHS, THE FOOD YOU BOUGHT JUST DIDN'T LAST AND YOU DIDN'T HAVE MONEY TO GET MORE.: NEVER TRUE

## 2023-02-21 SDOH — ECONOMIC STABILITY: FOOD INSECURITY: WITHIN THE PAST 12 MONTHS, YOU WORRIED THAT YOUR FOOD WOULD RUN OUT BEFORE YOU GOT MONEY TO BUY MORE.: NEVER TRUE

## 2023-02-21 SDOH — ECONOMIC STABILITY: HOUSING INSECURITY
IN THE LAST 12 MONTHS, WAS THERE A TIME WHEN YOU DID NOT HAVE A STEADY PLACE TO SLEEP OR SLEPT IN A SHELTER (INCLUDING NOW)?: NO

## 2023-02-21 ASSESSMENT — PATIENT HEALTH QUESTIONNAIRE - PHQ9
4. FEELING TIRED OR HAVING LITTLE ENERGY: 2
SUM OF ALL RESPONSES TO PHQ QUESTIONS 1-9: 11
7. TROUBLE CONCENTRATING ON THINGS, SUCH AS READING THE NEWSPAPER OR WATCHING TELEVISION: 1
9. THOUGHTS THAT YOU WOULD BE BETTER OFF DEAD, OR OF HURTING YOURSELF: 0
6. FEELING BAD ABOUT YOURSELF - OR THAT YOU ARE A FAILURE OR HAVE LET YOURSELF OR YOUR FAMILY DOWN: 1
SUM OF ALL RESPONSES TO PHQ QUESTIONS 1-9: 11
5. POOR APPETITE OR OVEREATING: 2
SUM OF ALL RESPONSES TO PHQ QUESTIONS 1-9: 11
3. TROUBLE FALLING OR STAYING ASLEEP: 3
2. FEELING DOWN, DEPRESSED OR HOPELESS: 1
1. LITTLE INTEREST OR PLEASURE IN DOING THINGS: 1
SUM OF ALL RESPONSES TO PHQ QUESTIONS 1-9: 11
8. MOVING OR SPEAKING SO SLOWLY THAT OTHER PEOPLE COULD HAVE NOTICED. OR THE OPPOSITE, BEING SO FIGETY OR RESTLESS THAT YOU HAVE BEEN MOVING AROUND A LOT MORE THAN USUAL: 0
10. IF YOU CHECKED OFF ANY PROBLEMS, HOW DIFFICULT HAVE THESE PROBLEMS MADE IT FOR YOU TO DO YOUR WORK, TAKE CARE OF THINGS AT HOME, OR GET ALONG WITH OTHER PEOPLE: 1
SUM OF ALL RESPONSES TO PHQ9 QUESTIONS 1 & 2: 2

## 2023-02-21 ASSESSMENT — ENCOUNTER SYMPTOMS
WHEEZING: 0
SORE THROAT: 0
GASTROINTESTINAL NEGATIVE: 1
CHEST TIGHTNESS: 0
TROUBLE SWALLOWING: 0
SHORTNESS OF BREATH: 0
RHINORRHEA: 0
COUGH: 0

## 2023-02-21 NOTE — PROGRESS NOTES
Nathalie SIEGEL Cau  1990  32 y.o.    SUBJECT CASIE:    Chief Complaint   Patient presents with    Medication Check     Prozac 10 mg wasn't strong enough. Now taking Prozac 20 mg. Pt feels that is not at therapeutic levels.     1 Month Follow-Up    Depression       HPI    Nathalie is a 32-year-old female who is in for 1 month follow-up of depression. She states she increased her Prozac to 20 mg on her own about 3 weeks after being on 10 mg. She states the medication is helping some but not adequate. Her PHQ9 score today is 11, down from previous score of 15.    She also complains of inadequate sleep. She states most nights she is able to get to sleep but only gets 4 hours. She states her children have told her that she snores a lot, no witness of periods of apnea.      Current Outpatient Medications on File Prior to Visit   Medication Sig Dispense Refill    atorvastatin (LIPITOR) 20 MG tablet Take 1 tablet by mouth daily 90 tablet 1    naproxen (NAPROSYN) 500 MG tablet Take 1 tablet by mouth 2 times daily 60 tablet 0    acetaminophen (TYLENOL) 325 MG tablet Take 2 tablets by mouth every 6 hours as needed for Pain 120 tablet 3    levonorgestrel (MIRENA) IUD 52 mg 1 each by Intrauterine route once      [DISCONTINUED] ibuprofen (ADVIL) 200 MG tablet Take 3 tablets by mouth every 8 hours as needed for Pain 60 tablet 0     No current facility-administered medications on file prior to visit.       Past Medical History:   Diagnosis Date    Gestational diabetes      Past Surgical History:   Procedure Laterality Date    TUBAL LIGATION      TYMPANOSTOMY TUBE PLACEMENT      x 7     Family History   Problem Relation Age of Onset    Asthma Mother     Diabetes Mother     High Blood Pressure Mother     High Cholesterol Mother     Hypothyroidism Mother     Other Mother         gerd    High Blood Pressure Father     Diabetes Father         pre-DM     Social History     Socioeconomic History    Marital status: Single     Spouse name:  Not on file    Number of children: Not on file    Years of education: Not on file    Highest education level: Not on file   Occupational History    Not on file   Tobacco Use    Smoking status: Never    Smokeless tobacco: Never   Vaping Use    Vaping Use: Never used   Substance and Sexual Activity    Alcohol use: Not Currently     Comment: weekly in the winter, rare rest of the year. Drug use: No    Sexual activity: Not on file   Other Topics Concern    Not on file   Social History Narrative    Not on file     Social Determinants of Health     Financial Resource Strain: Low Risk     Difficulty of Paying Living Expenses: Not hard at all   Food Insecurity: No Food Insecurity    Worried About [employer] [employer] in the Last Year: Never true    [address] [employer] Advisor in the Last Year: Never true   Transportation Needs: Unknown    Lack of Transportation (Medical): Not on file    Lack of Transportation (Non-Medical): No   Physical Activity: Not on file   Stress: Not on file   Social Connections: Not on file   Intimate Partner Violence: Not on file   Housing Stability: Unknown    Unable to Pay for Housing in the Last Year: Not on file    Number of Places Lived in the Last Year: Not on file    Unstable Housing in the Last Year: No       Review of Systems   Constitutional:  Positive for fatigue. Negative for activity change, appetite change, chills, diaphoresis, fever and unexpected weight change. HENT:  Negative for congestion, postnasal drip, rhinorrhea, sore throat and trouble swallowing. Respiratory:  Negative for cough, chest tightness, shortness of breath and wheezing. Cardiovascular:  Negative for chest pain and palpitations. Gastrointestinal: Negative. Neurological: Negative. Psychiatric/Behavioral:  Positive for dysphoric mood and sleep disturbance. Negative for self-injury and suicidal ideas. The patient is nervous/anxious.       OBJECTIVE:     /74 (Site: Right Upper Arm, Position: Sitting, Cuff Size: Large Adult)   Pulse 74   Temp 98.4 °F (36.9 °C) (Infrared)   Resp 18   Wt 218 lb (98.9 kg)   SpO2 98%   BMI 34.14 kg/m²     Physical Exam  Vitals reviewed. Constitutional:       General: She is not in acute distress. Appearance: Normal appearance. She is well-developed. She is obese. She is not ill-appearing or diaphoretic. HENT:      Head: Normocephalic and atraumatic. Right Ear: External ear normal.      Left Ear: External ear normal.   Eyes:      General: No scleral icterus. Conjunctiva/sclera: Conjunctivae normal.      Pupils: Pupils are equal, round, and reactive to light. Cardiovascular:      Rate and Rhythm: Normal rate and regular rhythm. Heart sounds: Normal heart sounds. No murmur heard. No friction rub. No gallop. Pulmonary:      Effort: Pulmonary effort is normal. No respiratory distress. Breath sounds: Normal breath sounds. No wheezing. Chest:      Chest wall: No tenderness. Musculoskeletal:         General: Normal range of motion. Cervical back: Normal range of motion and neck supple. Skin:     General: Skin is warm and dry. Neurological:      Mental Status: She is alert and oriented to person, place, and time. Psychiatric:         Behavior: Behavior normal.         Thought Content: Thought content normal.         Judgment: Judgment normal.       No results found for requested labs within last 30 days.      Hemoglobin A1C (%)   Date Value   03/02/2020 5.1     LDL Calculated (mg/dL)   Date Value   01/03/2023 see below       Lab Results   Component Value Date/Time    WBC 7.1 01/03/2023 05:35 PM    WBC 8.6 03/02/2020 03:36 PM    WBC 9.6 05/11/2015 09:15 PM    NEUTROABS 4.1 01/03/2023 05:35 PM    NEUTROABS 5.9 03/02/2020 03:36 PM    HGB 14.3 01/03/2023 05:35 PM    HGB 14.5 03/02/2020 03:36 PM    HGB 13.6 05/11/2015 09:15 PM    HCT 42.5 01/03/2023 05:35 PM    HCT 43.4 03/02/2020 03:36 PM    HCT 39.8 05/11/2015 09:15 PM    MCV 92.0 01/03/2023 05:35 PM    MCV 90.7 03/02/2020 03:36 PM    MCV 90.7 05/11/2015 09:15 PM     01/03/2023 05:35 PM     03/02/2020 03:36 PM     05/11/2015 09:15 PM    SEGSABS 6.6 05/11/2015 09:15 PM    LYMPHSABS 2.4 01/03/2023 05:35 PM    MONOSABS 0.4 01/03/2023 05:35 PM    EOSABS 0.2 01/03/2023 05:35 PM    BASOSABS 0.1 01/03/2023 05:35 PM     Lab Results   Component Value Date/Time    TSH 1.76 01/03/2023 05:35 PM     Lab Results   Component Value Date/Time    LABALBU 4.2 01/03/2023 05:35 PM    BILITOT <0.2 01/03/2023 05:35 PM    AST 24 01/03/2023 05:35 PM    ALT 42 01/03/2023 05:35 PM    ALKPHOS 95 01/03/2023 05:35 PM             No results found for this visit on 02/21/23. ASSESSMENT AND PLAN:     1. Depression, unspecified depression type  - increase dosage of medication  - FLUoxetine (PROZAC) 20 MG capsule; Take 1 capsule by mouth daily  Dispense: 30 capsule; Refill: 2    2. Sleep disturbance  - [address]  - hydrOXYzine HCl (ATARAX) 25 MG tablet; Take 1 tablet by mouth nightly  Dispense: 30 tablet; Refill: 2    3. Snoring  - [address]    Will add medication for sleep, may also help with some anxiety. Stay on 20 mg of Prozac for at least 5 weeks before adjusting dosage. Verbalized understanding and agreement with plan. Return in about 5 weeks (around 3/28/2023) for Medication Re-Evaluation, depression. Care discussed with patient. Patient educated on signs and symptoms of exacerbation and when to seek further medical attention. Advised to call for any problems, questions, or concerns. Patient verbalizes understanding and agrees with plan. Medications reviewed and reconciled. Continue current medications. Appropriate prescriptions are ordered. Risks and benefits of meds are discussed. After visit summary provided.

## 2023-05-17 ENCOUNTER — OFFICE VISIT (OUTPATIENT)
Dept: FAMILY MEDICINE CLINIC | Age: 33
End: 2023-05-17
Payer: COMMERCIAL

## 2023-05-17 VITALS
SYSTOLIC BLOOD PRESSURE: 112 MMHG | DIASTOLIC BLOOD PRESSURE: 76 MMHG | HEART RATE: 86 BPM | BODY MASS INDEX: 34.46 KG/M2 | WEIGHT: 220 LBS | OXYGEN SATURATION: 97 % | RESPIRATION RATE: 20 BRPM | TEMPERATURE: 98.1 F

## 2023-05-17 DIAGNOSIS — F32.A DEPRESSION, UNSPECIFIED DEPRESSION TYPE: Primary | ICD-10-CM

## 2023-05-17 PROCEDURE — G8427 DOCREV CUR MEDS BY ELIG CLIN: HCPCS | Performed by: NURSE PRACTITIONER

## 2023-05-17 PROCEDURE — 1036F TOBACCO NON-USER: CPT | Performed by: NURSE PRACTITIONER

## 2023-05-17 PROCEDURE — 99213 OFFICE O/P EST LOW 20 MIN: CPT | Performed by: NURSE PRACTITIONER

## 2023-05-17 PROCEDURE — G8417 CALC BMI ABV UP PARAM F/U: HCPCS | Performed by: NURSE PRACTITIONER

## 2023-05-17 RX ORDER — HYDROXYZINE HYDROCHLORIDE 25 MG/1
TABLET, FILM COATED ORAL
COMMUNITY
Start: 2023-05-10

## 2023-05-17 RX ORDER — FLUOXETINE HYDROCHLORIDE 40 MG/1
40 CAPSULE ORAL DAILY
Qty: 30 CAPSULE | Refills: 3 | Status: SHIPPED | OUTPATIENT
Start: 2023-05-17

## 2023-05-17 ASSESSMENT — PATIENT HEALTH QUESTIONNAIRE - PHQ9
6. FEELING BAD ABOUT YOURSELF - OR THAT YOU ARE A FAILURE OR HAVE LET YOURSELF OR YOUR FAMILY DOWN: 0
1. LITTLE INTEREST OR PLEASURE IN DOING THINGS: 2
SUM OF ALL RESPONSES TO PHQ9 QUESTIONS 1 & 2: 3
SUM OF ALL RESPONSES TO PHQ QUESTIONS 1-9: 6
10. IF YOU CHECKED OFF ANY PROBLEMS, HOW DIFFICULT HAVE THESE PROBLEMS MADE IT FOR YOU TO DO YOUR WORK, TAKE CARE OF THINGS AT HOME, OR GET ALONG WITH OTHER PEOPLE: 1
SUM OF ALL RESPONSES TO PHQ QUESTIONS 1-9: 6
2. FEELING DOWN, DEPRESSED OR HOPELESS: 1
5. POOR APPETITE OR OVEREATING: 0
4. FEELING TIRED OR HAVING LITTLE ENERGY: 1
9. THOUGHTS THAT YOU WOULD BE BETTER OFF DEAD, OR OF HURTING YOURSELF: 0
8. MOVING OR SPEAKING SO SLOWLY THAT OTHER PEOPLE COULD HAVE NOTICED. OR THE OPPOSITE, BEING SO FIGETY OR RESTLESS THAT YOU HAVE BEEN MOVING AROUND A LOT MORE THAN USUAL: 0
7. TROUBLE CONCENTRATING ON THINGS, SUCH AS READING THE NEWSPAPER OR WATCHING TELEVISION: 0
SUM OF ALL RESPONSES TO PHQ QUESTIONS 1-9: 6
SUM OF ALL RESPONSES TO PHQ QUESTIONS 1-9: 6
3. TROUBLE FALLING OR STAYING ASLEEP: 2

## 2023-05-17 ASSESSMENT — ENCOUNTER SYMPTOMS
WHEEZING: 0
CHEST TIGHTNESS: 0
SHORTNESS OF BREATH: 0
COUGH: 0

## 2023-05-17 NOTE — PROGRESS NOTES
Nathalie Moser  1990  28 y.o. SUBJECT CASIE:    Chief Complaint   Patient presents with    Follow-up    Discuss Medications    Depression     May need Prozac dosage increased. Rachelle Cardenas is a 28year old female who is in for follow up on depression. She states she has been taking Prozac 20 mg and finds it helps some but she feels an increase in dosage may be of benefit. She states she still has periods of irritabilityShe denies chest pain, palpitations or shortness of breath. PHQ 9 score is 6. Current Outpatient Medications on File Prior to Visit   Medication Sig Dispense Refill    hydrOXYzine HCl (ATARAX) 25 MG tablet       atorvastatin (LIPITOR) 20 MG tablet Take 1 tablet by mouth daily 90 tablet 1    naproxen (NAPROSYN) 500 MG tablet Take 1 tablet by mouth 2 times daily 60 tablet 0    acetaminophen (TYLENOL) 325 MG tablet Take 2 tablets by mouth every 6 hours as needed for Pain 120 tablet 3    levonorgestrel (MIRENA) IUD 52 mg 1 each by IntraUTERine route once      [DISCONTINUED] ibuprofen (ADVIL) 200 MG tablet Take 3 tablets by mouth every 8 hours as needed for Pain 60 tablet 0     No current facility-administered medications on file prior to visit.        Past Medical History:   Diagnosis Date    Gestational diabetes      Past Surgical History:   Procedure Laterality Date    TUBAL LIGATION      TYMPANOSTOMY TUBE PLACEMENT      x 7     Family History   Problem Relation Age of Onset    Asthma Mother     Diabetes Mother     High Blood Pressure Mother     High Cholesterol Mother     Hypothyroidism Mother     Other Mother         gerd    High Blood Pressure Father     Diabetes Father         pre-DM     Social History     Socioeconomic History    Marital status: Single     Spouse name: Not on file    Number of children: Not on file    Years of education: Not on file    Highest education level: Not on file   Occupational History    Not on file   Tobacco Use    Smoking status: Never    Smokeless tobacco:

## 2023-05-21 ASSESSMENT — ENCOUNTER SYMPTOMS: GASTROINTESTINAL NEGATIVE: 1

## 2023-08-10 ENCOUNTER — OFFICE VISIT (OUTPATIENT)
Dept: FAMILY MEDICINE CLINIC | Age: 33
End: 2023-08-10
Payer: COMMERCIAL

## 2023-08-10 VITALS
HEART RATE: 68 BPM | BODY MASS INDEX: 34.91 KG/M2 | DIASTOLIC BLOOD PRESSURE: 80 MMHG | WEIGHT: 222.4 LBS | HEIGHT: 67 IN | TEMPERATURE: 98.1 F | OXYGEN SATURATION: 98 % | SYSTOLIC BLOOD PRESSURE: 118 MMHG

## 2023-08-10 DIAGNOSIS — R10.9 RIGHT FLANK PAIN: Primary | ICD-10-CM

## 2023-08-10 DIAGNOSIS — K21.9 GASTROESOPHAGEAL REFLUX DISEASE WITHOUT ESOPHAGITIS: ICD-10-CM

## 2023-08-10 DIAGNOSIS — M54.9 MID BACK PAIN: ICD-10-CM

## 2023-08-10 DIAGNOSIS — R07.9 CHEST PAIN, UNSPECIFIED TYPE: ICD-10-CM

## 2023-08-10 LAB
BILIRUBIN, POC: ABNORMAL
BLOOD URINE, POC: ABNORMAL
CLARITY, POC: ABNORMAL
COLOR, POC: YELLOW
GLUCOSE URINE, POC: ABNORMAL
KETONES, POC: ABNORMAL
LEUKOCYTE EST, POC: ABNORMAL
NITRITE, POC: ABNORMAL
PH, POC: 6
PROTEIN, POC: ABNORMAL
SPECIFIC GRAVITY, POC: >=1.03
UROBILINOGEN, POC: ABNORMAL

## 2023-08-10 PROCEDURE — 81002 URINALYSIS NONAUTO W/O SCOPE: CPT | Performed by: NURSE PRACTITIONER

## 2023-08-10 PROCEDURE — 93000 ELECTROCARDIOGRAM COMPLETE: CPT | Performed by: NURSE PRACTITIONER

## 2023-08-10 PROCEDURE — G8427 DOCREV CUR MEDS BY ELIG CLIN: HCPCS | Performed by: NURSE PRACTITIONER

## 2023-08-10 PROCEDURE — 1036F TOBACCO NON-USER: CPT | Performed by: NURSE PRACTITIONER

## 2023-08-10 PROCEDURE — G8417 CALC BMI ABV UP PARAM F/U: HCPCS | Performed by: NURSE PRACTITIONER

## 2023-08-10 PROCEDURE — 99213 OFFICE O/P EST LOW 20 MIN: CPT | Performed by: NURSE PRACTITIONER

## 2023-08-10 RX ORDER — OMEPRAZOLE 20 MG/1
20 CAPSULE, DELAYED RELEASE ORAL
Qty: 30 CAPSULE | Refills: 5 | Status: SHIPPED | OUTPATIENT
Start: 2023-08-10

## 2023-08-10 ASSESSMENT — ENCOUNTER SYMPTOMS
SINUS PAIN: 0
GASTROINTESTINAL NEGATIVE: 1
COUGH: 0
BACK PAIN: 1
SORE THROAT: 0
CHEST TIGHTNESS: 0
TROUBLE SWALLOWING: 0
SINUS PRESSURE: 0
WHEEZING: 0
SHORTNESS OF BREATH: 0

## 2023-08-10 NOTE — PROGRESS NOTES
Nathalie Moser  1990  28 y.o. SUBJECT CASIE:      Elías Barber is a 28year old female who is in with one month complaint of left sided chest pressure. She states she has left arm discomfort associated with chest pressure. She denies headache or dizziness. She denies nausea, chest pain, palpitations or shortness of breath. She denies problems with asthma or cough. She also complains of 2 weeks of  mid back pain and joint pain. She denies injury, fevers, chills or sweats. Current Outpatient Medications on File Prior to Visit   Medication Sig Dispense Refill    hydrOXYzine HCl (ATARAX) 25 MG tablet       atorvastatin (LIPITOR) 20 MG tablet Take 1 tablet by mouth daily 90 tablet 1    [DISCONTINUED] ibuprofen (ADVIL) 200 MG tablet Take 3 tablets by mouth every 8 hours as needed for Pain 60 tablet 0    levonorgestrel (MIRENA) IUD 52 mg 1 each by IntraUTERine route once      FLUoxetine (PROZAC) 40 MG capsule Take 1 capsule by mouth daily 30 capsule 3    naproxen (NAPROSYN) 500 MG tablet Take 1 tablet by mouth 2 times daily (Patient not taking: Reported on 8/10/2023) 60 tablet 0    acetaminophen (TYLENOL) 325 MG tablet Take 2 tablets by mouth every 6 hours as needed for Pain (Patient not taking: Reported on 8/10/2023) 120 tablet 3     No current facility-administered medications on file prior to visit.        Past Medical History:   Diagnosis Date    Chronic back pain     Gestational diabetes     Hyperlipidemia      Past Surgical History:   Procedure Laterality Date    TUBAL LIGATION      TYMPANOSTOMY TUBE PLACEMENT      x 7     Family History   Problem Relation Age of Onset    Asthma Mother     Diabetes Mother     High Blood Pressure Mother     High Cholesterol Mother     Hypothyroidism Mother     Other Mother         gerd    High Blood Pressure Father     Diabetes Father         pre-DM     Social History     Socioeconomic History    Marital status: Single     Spouse name: Not on file    Number of children:

## 2024-05-13 ENCOUNTER — OFFICE VISIT (OUTPATIENT)
Dept: FAMILY MEDICINE CLINIC | Age: 34
End: 2024-05-13
Payer: COMMERCIAL

## 2024-05-13 VITALS
SYSTOLIC BLOOD PRESSURE: 104 MMHG | HEART RATE: 90 BPM | OXYGEN SATURATION: 97 % | TEMPERATURE: 98.3 F | WEIGHT: 218 LBS | BODY MASS INDEX: 34.21 KG/M2 | HEIGHT: 67 IN | DIASTOLIC BLOOD PRESSURE: 78 MMHG

## 2024-05-13 DIAGNOSIS — R05.2 SUBACUTE COUGH: ICD-10-CM

## 2024-05-13 DIAGNOSIS — R07.89 CHEST WALL PAIN: ICD-10-CM

## 2024-05-13 DIAGNOSIS — J40 BRONCHITIS: ICD-10-CM

## 2024-05-13 DIAGNOSIS — Z76.0 MEDICATION REFILL: ICD-10-CM

## 2024-05-13 DIAGNOSIS — J02.9 SORE THROAT: Primary | ICD-10-CM

## 2024-05-13 DIAGNOSIS — F32.A DEPRESSION, UNSPECIFIED DEPRESSION TYPE: ICD-10-CM

## 2024-05-13 LAB — S PYO AG THROAT QL: NORMAL

## 2024-05-13 PROCEDURE — 99213 OFFICE O/P EST LOW 20 MIN: CPT | Performed by: NURSE PRACTITIONER

## 2024-05-13 PROCEDURE — 87880 STREP A ASSAY W/OPTIC: CPT | Performed by: NURSE PRACTITIONER

## 2024-05-13 RX ORDER — FLUOXETINE HYDROCHLORIDE 40 MG/1
40 CAPSULE ORAL DAILY
Qty: 30 CAPSULE | Refills: 3 | Status: SHIPPED | OUTPATIENT
Start: 2024-05-13

## 2024-05-13 RX ORDER — GUAIFENESIN 600 MG/1
600 TABLET, EXTENDED RELEASE ORAL 2 TIMES DAILY
Qty: 30 TABLET | Refills: 0 | Status: SHIPPED | OUTPATIENT
Start: 2024-05-13 | End: 2024-05-28

## 2024-05-13 RX ORDER — DEXTROMETHORPHAN HYDROBROMIDE AND PROMETHAZINE HYDROCHLORIDE 15; 6.25 MG/5ML; MG/5ML
SYRUP ORAL 4 TIMES DAILY PRN
COMMUNITY

## 2024-05-13 SDOH — ECONOMIC STABILITY: FOOD INSECURITY: WITHIN THE PAST 12 MONTHS, YOU WORRIED THAT YOUR FOOD WOULD RUN OUT BEFORE YOU GOT MONEY TO BUY MORE.: NEVER TRUE

## 2024-05-13 SDOH — ECONOMIC STABILITY: FOOD INSECURITY: WITHIN THE PAST 12 MONTHS, THE FOOD YOU BOUGHT JUST DIDN'T LAST AND YOU DIDN'T HAVE MONEY TO GET MORE.: NEVER TRUE

## 2024-05-13 SDOH — ECONOMIC STABILITY: INCOME INSECURITY: HOW HARD IS IT FOR YOU TO PAY FOR THE VERY BASICS LIKE FOOD, HOUSING, MEDICAL CARE, AND HEATING?: NOT HARD AT ALL

## 2024-05-13 ASSESSMENT — PATIENT HEALTH QUESTIONNAIRE - PHQ9
8. MOVING OR SPEAKING SO SLOWLY THAT OTHER PEOPLE COULD HAVE NOTICED. OR THE OPPOSITE, BEING SO FIGETY OR RESTLESS THAT YOU HAVE BEEN MOVING AROUND A LOT MORE THAN USUAL: MORE THAN HALF THE DAYS
SUM OF ALL RESPONSES TO PHQ QUESTIONS 1-9: 8
SUM OF ALL RESPONSES TO PHQ9 QUESTIONS 1 & 2: 1
7. TROUBLE CONCENTRATING ON THINGS, SUCH AS READING THE NEWSPAPER OR WATCHING TELEVISION: NOT AT ALL
1. LITTLE INTEREST OR PLEASURE IN DOING THINGS: NOT AT ALL
SUM OF ALL RESPONSES TO PHQ QUESTIONS 1-9: 8
9. THOUGHTS THAT YOU WOULD BE BETTER OFF DEAD, OR OF HURTING YOURSELF: NOT AT ALL
SUM OF ALL RESPONSES TO PHQ QUESTIONS 1-9: 8
5. POOR APPETITE OR OVEREATING: NOT AT ALL
6. FEELING BAD ABOUT YOURSELF - OR THAT YOU ARE A FAILURE OR HAVE LET YOURSELF OR YOUR FAMILY DOWN: SEVERAL DAYS
4. FEELING TIRED OR HAVING LITTLE ENERGY: MORE THAN HALF THE DAYS
2. FEELING DOWN, DEPRESSED OR HOPELESS: SEVERAL DAYS
SUM OF ALL RESPONSES TO PHQ QUESTIONS 1-9: 8
3. TROUBLE FALLING OR STAYING ASLEEP: MORE THAN HALF THE DAYS
10. IF YOU CHECKED OFF ANY PROBLEMS, HOW DIFFICULT HAVE THESE PROBLEMS MADE IT FOR YOU TO DO YOUR WORK, TAKE CARE OF THINGS AT HOME, OR GET ALONG WITH OTHER PEOPLE: SOMEWHAT DIFFICULT

## 2024-05-13 ASSESSMENT — ENCOUNTER SYMPTOMS
TROUBLE SWALLOWING: 0
WHEEZING: 0
CHEST TIGHTNESS: 1
SINUS PAIN: 0
SHORTNESS OF BREATH: 1
COUGH: 1
SINUS PRESSURE: 0
SORE THROAT: 1

## 2024-05-13 ASSESSMENT — ANXIETY QUESTIONNAIRES
5. BEING SO RESTLESS THAT IT IS HARD TO SIT STILL: NEARLY EVERY DAY
1. FEELING NERVOUS, ANXIOUS, OR ON EDGE: SEVERAL DAYS
4. TROUBLE RELAXING: NEARLY EVERY DAY
2. NOT BEING ABLE TO STOP OR CONTROL WORRYING: MORE THAN HALF THE DAYS
IF YOU CHECKED OFF ANY PROBLEMS ON THIS QUESTIONNAIRE, HOW DIFFICULT HAVE THESE PROBLEMS MADE IT FOR YOU TO DO YOUR WORK, TAKE CARE OF THINGS AT HOME, OR GET ALONG WITH OTHER PEOPLE: SOMEWHAT DIFFICULT
7. FEELING AFRAID AS IF SOMETHING AWFUL MIGHT HAPPEN: NOT AT ALL
GAD7 TOTAL SCORE: 12
3. WORRYING TOO MUCH ABOUT DIFFERENT THINGS: MORE THAN HALF THE DAYS
6. BECOMING EASILY ANNOYED OR IRRITABLE: SEVERAL DAYS

## 2024-05-13 NOTE — PROGRESS NOTES
Nathalie SIEGEL Caupp  1990  34 y.o.    SUBJECT CASIE:    Chief Complaint   Patient presents with    ED Follow-up     [facility] ED 5/10/24    Cough     x1 week but worsened on 5/8    Chest Pain     Pt states pain is sharp and aching and rates the pain an 8. Pt states the pain started on 5/8 during the night and pt states she felt like she stopped breathing.  Feels like her heart is pounding like its coming out of her chest when moving or walking at least 20 steps but will slow down when sitting still and the pain remains.    Headache     x1 week but worsened on 5/8    Shortness of Breath     Pt feels like she can't breath and becomes dizzy.      Dizziness    Pharyngitis       XENA Zelaya is a 34 year old female who is in for ED follow up. She states she went to the ED because of cough, chest pain, headache and sore throat. She was diagnosed with bronchitis, given cough medication and had Torodol shot for pain. Chest xray and blood work to check for PE were negative. She states she takes the cough medication at times. She is trying to hold the cough because of the muscle pain she has in her chest. She is also complaining of sore throat on and off for the last week. She states her 7 year old son had strep about 10 days ago and was treated.    Anxiety, depression  She states she is under a lot of stress. She presents with paperwork that her  said she needed to get completed in order to maintain her job. She states she has missed some days because of being off for children's illnesses and appointments. PHQ 9 score - 8. She is requesting to restart medication.    Current Outpatient Medications on File Prior to Visit   Medication Sig Dispense Refill    promethazine-dextromethorphan (PROMETHAZINE-DM) 6.25-15 MG/5ML syrup Take by mouth 4 times daily as needed for Cough      levonorgestrel (MIRENA) IUD 52 mg 1 each by IntraUTERine route once      [DISCONTINUED] ibuprofen (ADVIL) 200 MG tablet Take 3 tablets

## 2024-05-16 ENCOUNTER — OFFICE VISIT (OUTPATIENT)
Dept: FAMILY MEDICINE CLINIC | Age: 34
End: 2024-05-16
Payer: COMMERCIAL

## 2024-05-16 VITALS
SYSTOLIC BLOOD PRESSURE: 126 MMHG | OXYGEN SATURATION: 97 % | DIASTOLIC BLOOD PRESSURE: 86 MMHG | TEMPERATURE: 98.2 F | HEART RATE: 99 BPM | WEIGHT: 213.4 LBS | BODY MASS INDEX: 33.49 KG/M2 | HEIGHT: 67 IN

## 2024-05-16 DIAGNOSIS — Z02.9 ADMINISTRATIVE ENCOUNTER: Primary | ICD-10-CM

## 2024-05-16 DIAGNOSIS — F41.9 ANXIETY: ICD-10-CM

## 2024-05-16 DIAGNOSIS — F32.A DEPRESSION, UNSPECIFIED DEPRESSION TYPE: ICD-10-CM

## 2024-05-16 PROCEDURE — 99213 OFFICE O/P EST LOW 20 MIN: CPT | Performed by: NURSE PRACTITIONER

## 2024-05-16 NOTE — PROGRESS NOTES
Nathalie SIEGEL Shanti  1990  34 y.o.    SUBJECT CASIE:    Chief Complaint   Patient presents with    Anxiety     FMLA paperwork       HPI  She is here for FMLA paperwork completion. She is requesting FMLA for anxiety, depression which she has been dealing with for a number of years. She was initially seen by me on 2/22/21 and placed on medication. She states she had counseling at the time which was helpful but that counselor moved away. She states she has not seen any for the last 2 years. She is agreeable to include counseling with her current medication which was restarted on 5/13/24.    Current Outpatient Medications on File Prior to Visit   Medication Sig Dispense Refill    promethazine-dextromethorphan (PROMETHAZINE-DM) 6.25-15 MG/5ML syrup Take by mouth 4 times daily as needed for Cough      guaiFENesin (MUCINEX) 600 MG extended release tablet Take 1 tablet by mouth 2 times daily for 15 days 30 tablet 0    FLUoxetine (PROZAC) 40 MG capsule Take 1 capsule by mouth daily 30 capsule 3    levonorgestrel (MIRENA) IUD 52 mg 1 each by IntraUTERine route once      [DISCONTINUED] ibuprofen (ADVIL) 200 MG tablet Take 3 tablets by mouth every 8 hours as needed for Pain 60 tablet 0     No current facility-administered medications on file prior to visit.       Past Medical History:   Diagnosis Date    Chronic back pain     Depression 5/13/2024    Gestational diabetes     Hyperlipidemia      Past Surgical History:   Procedure Laterality Date    TONSILLECTOMY      TUBAL LIGATION      TYMPANOSTOMY TUBE PLACEMENT      x 7     Family History   Problem Relation Age of Onset    Asthma Mother     Diabetes Mother     High Blood Pressure Mother     High Cholesterol Mother     Hypothyroidism Mother     Other Mother         gerd    High Blood Pressure Father     Diabetes Father         pre-DM     Social History     Socioeconomic History    Marital status: Single     Spouse name: Not on file    Number of children: Not on file    Years of

## 2024-07-18 ENCOUNTER — HOSPITAL ENCOUNTER (EMERGENCY)
Age: 34
Discharge: HOME | End: 2024-07-18
Attending: EMERGENCY MEDICINE
Payer: COMMERCIAL

## 2024-07-18 VITALS
RESPIRATION RATE: 15 BRPM | HEIGHT: 67 IN | TEMPERATURE: 98.4 F | OXYGEN SATURATION: 97 % | DIASTOLIC BLOOD PRESSURE: 93 MMHG | HEART RATE: 90 BPM | WEIGHT: 218 LBS | SYSTOLIC BLOOD PRESSURE: 134 MMHG | BODY MASS INDEX: 34.21 KG/M2

## 2024-07-18 DIAGNOSIS — T63.481A INSECT STING ALLERGY, CURRENT REACTION, ACCIDENTAL OR UNINTENTIONAL, INITIAL ENCOUNTER: Primary | ICD-10-CM

## 2024-07-18 PROCEDURE — 99283 EMERGENCY DEPT VISIT LOW MDM: CPT

## 2024-07-18 PROCEDURE — 6370000000 HC RX 637 (ALT 250 FOR IP): Performed by: EMERGENCY MEDICINE

## 2024-07-18 RX ORDER — METHYLPREDNISOLONE 4 MG/1
TABLET ORAL
Qty: 1 KIT | Refills: 0 | Status: SHIPPED | OUTPATIENT
Start: 2024-07-18

## 2024-07-18 RX ORDER — DIPHENHYDRAMINE HCL 25 MG
25 CAPSULE ORAL EVERY 4 HOURS PRN
Qty: 30 CAPSULE | Refills: 0 | Status: SHIPPED | OUTPATIENT
Start: 2024-07-18 | End: 2024-07-28

## 2024-07-18 RX ORDER — PREDNISONE 20 MG/1
60 TABLET ORAL ONCE
Status: COMPLETED | OUTPATIENT
Start: 2024-07-18 | End: 2024-07-18

## 2024-07-18 RX ADMIN — PREDNISONE 60 MG: 20 TABLET ORAL at 18:31

## 2024-07-18 ASSESSMENT — PAIN DESCRIPTION - LOCATION: LOCATION: ANKLE

## 2024-07-18 ASSESSMENT — PAIN SCALES - GENERAL: PAINLEVEL_OUTOF10: 6

## 2024-07-18 ASSESSMENT — PAIN - FUNCTIONAL ASSESSMENT: PAIN_FUNCTIONAL_ASSESSMENT: 0-10

## 2024-07-18 NOTE — ED NOTES
Patient discharged with 2 escript prescriptions; education of medications reviewed. Pt verbalized understanding of discharge instructions. All questions answered and patient understands follow up instructions.

## 2024-07-18 NOTE — DISCHARGE INSTRUCTIONS
Use ice to the area and keep your foot elevated.  Use medications as directed.  Return to the emergency department for any worsening signs and symptoms.

## 2024-07-18 NOTE — ED PROVIDER NOTES
Emergency Department Encounter  Location: [facility]    Patient: Nathalie Moser  MRN: [id number]  : 1990  Date of evaluation: 2024  ED Provider: Ananth Bustos DO, FACEP    Chief Complaint:    Insect Bite (REPORTS STUNG BY A BEE 2 DAYS AGO RIGHT ANKLE PAIN, SWELLING, AND REDNESS)    [place]:  Nathalie Moser is a 34 y.o. female that presents to the emergency department with complaints of redness and swelling to her right ankle.  The patient was stung by a bee or wasp 2 days ago.  She went to school today and her ankle started swelling.  She states the area is painful and itchy.  She has used no over-the-counter medication for this problem.  She is not sure if it was a bee or a wasp that stung her.  She states it has been many years since she has been stung and she does not recall how she reacted previously.    ROS:  At least 4 systems reviewed and otherwise acutely negative except as in the [place].  Negative for fever or chills  Negative for chest pain  Negative for shortness of breath  Negative for nausea vomiting diarrhea or constipation    Past Medical History:   Diagnosis Date    Chronic back pain     Depression 2024    Gestational diabetes     Hyperlipidemia      Past Surgical History:   Procedure Laterality Date    TONSILLECTOMY      TUBAL LIGATION      TYMPANOSTOMY TUBE PLACEMENT      x 7     Family History   Problem Relation Age of Onset    Asthma Mother     Diabetes Mother     High Blood Pressure Mother     High Cholesterol Mother     Hypothyroidism Mother     Other Mother         gerd    High Blood Pressure Father     Diabetes Father         pre-DM     Social History     Socioeconomic History    Marital status: Single     Spouse name: Not on file    Number of children: Not on file    Years of education: Not on file    Highest education level: Not on file   Occupational History    Not on file   Tobacco Use    Smoking status: Never    Smokeless tobacco: Never   Vaping

## 2024-10-09 ENCOUNTER — OFFICE VISIT (OUTPATIENT)
Dept: FAMILY MEDICINE CLINIC | Age: 34
End: 2024-10-09
Payer: COMMERCIAL

## 2024-10-09 VITALS
TEMPERATURE: 98.1 F | SYSTOLIC BLOOD PRESSURE: 130 MMHG | WEIGHT: 220 LBS | OXYGEN SATURATION: 97 % | HEART RATE: 80 BPM | BODY MASS INDEX: 34.46 KG/M2 | DIASTOLIC BLOOD PRESSURE: 78 MMHG | RESPIRATION RATE: 18 BRPM

## 2024-10-09 DIAGNOSIS — R82.90 MALODOROUS URINE: ICD-10-CM

## 2024-10-09 DIAGNOSIS — J40 BRONCHITIS: ICD-10-CM

## 2024-10-09 DIAGNOSIS — J01.00 ACUTE NON-RECURRENT MAXILLARY SINUSITIS: ICD-10-CM

## 2024-10-09 DIAGNOSIS — R35.0 URINARY FREQUENCY: Primary | ICD-10-CM

## 2024-10-09 LAB
BILIRUBIN, POC: NORMAL
BLOOD URINE, POC: NORMAL
CLARITY, POC: NORMAL
COLOR, POC: NORMAL
GLUCOSE URINE, POC: NORMAL MG/DL
KETONES, POC: NORMAL MG/DL
LEUKOCYTE EST, POC: NORMAL
NITRITE, POC: NORMAL
PH, POC: 5.5
PROTEIN, POC: NORMAL MG/DL
SPECIFIC GRAVITY, POC: 1.03
UROBILINOGEN, POC: NORMAL MG/DL

## 2024-10-09 PROCEDURE — 81002 URINALYSIS NONAUTO W/O SCOPE: CPT | Performed by: NURSE PRACTITIONER

## 2024-10-09 PROCEDURE — 99214 OFFICE O/P EST MOD 30 MIN: CPT | Performed by: NURSE PRACTITIONER

## 2024-10-09 RX ORDER — GUAIFENESIN 600 MG/1
600 TABLET, EXTENDED RELEASE ORAL 2 TIMES DAILY
Qty: 30 TABLET | Refills: 0 | Status: SHIPPED | OUTPATIENT
Start: 2024-10-09 | End: 2024-10-24

## 2024-10-09 RX ORDER — AZITHROMYCIN 250 MG/1
TABLET, FILM COATED ORAL
Qty: 6 TABLET | Refills: 0 | Status: SHIPPED | OUTPATIENT
Start: 2024-10-09 | End: 2024-10-19

## 2024-10-09 ASSESSMENT — ENCOUNTER SYMPTOMS
SORE THROAT: 1
CHEST TIGHTNESS: 0
COUGH: 1
SHORTNESS OF BREATH: 0
TROUBLE SWALLOWING: 1
RHINORRHEA: 1
WHEEZING: 0

## 2024-10-09 NOTE — PROGRESS NOTES
dysuria and frequency. Negative for flank pain and hematuria.   Neurological:  Positive for headaches. Negative for dizziness.       OBJECTIVE:     /78 (Site: Right Upper Arm, Position: Sitting, Cuff Size: Medium Adult)   Pulse 80   Temp 98.1 °F (36.7 °C) (Infrared)   Resp 18   Wt 99.8 kg (220 lb)   LMP 09/25/2024 (Approximate)   SpO2 97%   BMI 34.46 kg/m²     Physical Exam  Vitals reviewed.   Constitutional:       General: She is not in acute distress.     Appearance: Normal appearance. She is well-developed. She is obese. She is not ill-appearing, toxic-appearing or diaphoretic.   HENT:      Head: Normocephalic and atraumatic.      Right Ear: Tympanic membrane and external ear normal.      Left Ear: Tympanic membrane and external ear normal.      Nose: Congestion present. No rhinorrhea.      Right Sinus: Maxillary sinus tenderness present.      Left Sinus: Maxillary sinus tenderness present.      Mouth/Throat:      Mouth: Mucous membranes are moist.      Pharynx: No oropharyngeal exudate or posterior oropharyngeal erythema.   Eyes:      Conjunctiva/sclera: Conjunctivae normal.      Pupils: Pupils are equal, round, and reactive to light.   Cardiovascular:      Rate and Rhythm: Normal rate and regular rhythm.      Heart sounds: Normal heart sounds. No murmur heard.     No friction rub. No gallop.   Pulmonary:      Effort: Pulmonary effort is normal. No respiratory distress.      Breath sounds: Normal breath sounds. No wheezing.   Chest:      Chest wall: No tenderness.   Abdominal:      Palpations: Abdomen is soft.   Musculoskeletal:         General: Normal range of motion.      Cervical back: Normal range of motion and neck supple. No rigidity or tenderness.   Lymphadenopathy:      Cervical: No cervical adenopathy.   Skin:     General: Skin is warm and dry.   Neurological:      Mental Status: She is alert and oriented to person, place, and time.      Motor: No weakness.      Gait: Gait normal.

## 2024-10-10 LAB — BACTERIA UR CULT: NORMAL

## 2024-12-10 ENCOUNTER — OFFICE VISIT (OUTPATIENT)
Dept: FAMILY MEDICINE CLINIC | Age: 34
End: 2024-12-10

## 2024-12-10 VITALS
HEART RATE: 85 BPM | HEIGHT: 67 IN | DIASTOLIC BLOOD PRESSURE: 78 MMHG | BODY MASS INDEX: 34.28 KG/M2 | WEIGHT: 218.4 LBS | TEMPERATURE: 97.9 F | OXYGEN SATURATION: 98 % | SYSTOLIC BLOOD PRESSURE: 122 MMHG

## 2024-12-10 DIAGNOSIS — R51.9 CHRONIC NONINTRACTABLE HEADACHE, UNSPECIFIED HEADACHE TYPE: ICD-10-CM

## 2024-12-10 DIAGNOSIS — F32.A DEPRESSION, UNSPECIFIED DEPRESSION TYPE: ICD-10-CM

## 2024-12-10 DIAGNOSIS — Z02.9 ADMINISTRATIVE ENCOUNTER: Primary | ICD-10-CM

## 2024-12-10 DIAGNOSIS — Z13.6 SCREENING FOR CARDIOVASCULAR CONDITION: ICD-10-CM

## 2024-12-10 DIAGNOSIS — Z76.0 MEDICATION REFILL: ICD-10-CM

## 2024-12-10 DIAGNOSIS — G89.29 CHRONIC NONINTRACTABLE HEADACHE, UNSPECIFIED HEADACHE TYPE: ICD-10-CM

## 2024-12-10 RX ORDER — FLUOXETINE 40 MG/1
40 CAPSULE ORAL DAILY
Qty: 30 CAPSULE | Refills: 5 | Status: SHIPPED | OUTPATIENT
Start: 2024-12-10

## 2024-12-10 ASSESSMENT — PATIENT HEALTH QUESTIONNAIRE - PHQ9
3. TROUBLE FALLING OR STAYING ASLEEP: MORE THAN HALF THE DAYS
SUM OF ALL RESPONSES TO PHQ QUESTIONS 1-9: 10
6. FEELING BAD ABOUT YOURSELF - OR THAT YOU ARE A FAILURE OR HAVE LET YOURSELF OR YOUR FAMILY DOWN: MORE THAN HALF THE DAYS
4. FEELING TIRED OR HAVING LITTLE ENERGY: MORE THAN HALF THE DAYS
9. THOUGHTS THAT YOU WOULD BE BETTER OFF DEAD, OR OF HURTING YOURSELF: NOT AT ALL
2. FEELING DOWN, DEPRESSED OR HOPELESS: SEVERAL DAYS
10. IF YOU CHECKED OFF ANY PROBLEMS, HOW DIFFICULT HAVE THESE PROBLEMS MADE IT FOR YOU TO DO YOUR WORK, TAKE CARE OF THINGS AT HOME, OR GET ALONG WITH OTHER PEOPLE: SOMEWHAT DIFFICULT
1. LITTLE INTEREST OR PLEASURE IN DOING THINGS: SEVERAL DAYS
SUM OF ALL RESPONSES TO PHQ QUESTIONS 1-9: 10
7. TROUBLE CONCENTRATING ON THINGS, SUCH AS READING THE NEWSPAPER OR WATCHING TELEVISION: SEVERAL DAYS
5. POOR APPETITE OR OVEREATING: NOT AT ALL
SUM OF ALL RESPONSES TO PHQ QUESTIONS 1-9: 10
SUM OF ALL RESPONSES TO PHQ9 QUESTIONS 1 & 2: 2
SUM OF ALL RESPONSES TO PHQ QUESTIONS 1-9: 10
8. MOVING OR SPEAKING SO SLOWLY THAT OTHER PEOPLE COULD HAVE NOTICED. OR THE OPPOSITE, BEING SO FIGETY OR RESTLESS THAT YOU HAVE BEEN MOVING AROUND A LOT MORE THAN USUAL: SEVERAL DAYS

## 2024-12-10 ASSESSMENT — ENCOUNTER SYMPTOMS
CHEST TIGHTNESS: 0
COUGH: 0
WHEEZING: 0
SHORTNESS OF BREATH: 0

## 2024-12-10 NOTE — PROGRESS NOTES
90.7 05/11/2015 09:15 PM     01/03/2023 05:35 PM     03/02/2020 03:36 PM     05/11/2015 09:15 PM    LYMPHSABS 2.4 01/03/2023 05:35 PM    MONOSABS 0.4 01/03/2023 05:35 PM    EOSABS 0.2 01/03/2023 05:35 PM    BASOSABS 0.1 01/03/2023 05:35 PM     Lab Results   Component Value Date/Time    TSH 1.76 01/03/2023 05:35 PM    TSH 1.74 03/02/2020 03:36 PM     Lab Results   Component Value Date/Time    BILITOT <0.2 01/03/2023 05:35 PM    AST 24 01/03/2023 05:35 PM    ALT 42 01/03/2023 05:35 PM    ALKPHOS 95 01/03/2023 05:35 PM             No results found for this visit on 12/10/24.    ASSESSMENT AND PLAN:     1. Administrative encounter  - [place] paperwork completed    2. Depression, unspecified depression type  - stable, continue current medication  - FLUoxetine (PROZAC) 40 MG capsule; Take 1 capsule by mouth daily  Dispense: 30 capsule; Refill: 5    3. Medication refill  - FLUoxetine (PROZAC) 40 MG capsule; Take 1 capsule by mouth daily  Dispense: 30 capsule; Refill: 5    4. Chronic nonintractable headache, unspecified headache type  - CBC with Auto Differential; Future  - Comprehensive Metabolic Panel; Future  - TSH; Future    5. Screening for cardiovascular condition  - Lipid Panel; Future      Return in about 2 months (around 2/10/2025) for headaches.    Care discussed with patient. Patient educated on signs and symptoms of exacerbation and when to seek further medical attention. Advised to call for any problems, questions, or concerns. Patient verbalizes understanding and agrees with plan.     Medications reviewed and reconciled. Continue current medications.  Appropriate prescriptions are ordered. Risks and benefits of meds are discussed.     After visit summary provided.

## 2025-05-28 ENCOUNTER — OFFICE VISIT (OUTPATIENT)
Dept: FAMILY MEDICINE CLINIC | Age: 35
End: 2025-05-28
Payer: COMMERCIAL

## 2025-05-28 VITALS
DIASTOLIC BLOOD PRESSURE: 74 MMHG | BODY MASS INDEX: 35.44 KG/M2 | TEMPERATURE: 98.7 F | HEART RATE: 84 BPM | WEIGHT: 225.8 LBS | OXYGEN SATURATION: 97 % | SYSTOLIC BLOOD PRESSURE: 118 MMHG | HEIGHT: 67 IN

## 2025-05-28 DIAGNOSIS — Z02.9 ADMINISTRATIVE ENCOUNTER: ICD-10-CM

## 2025-05-28 DIAGNOSIS — F41.9 ANXIETY: ICD-10-CM

## 2025-05-28 DIAGNOSIS — F32.89 OTHER DEPRESSION: Primary | ICD-10-CM

## 2025-05-28 PROCEDURE — G8417 CALC BMI ABV UP PARAM F/U: HCPCS | Performed by: NURSE PRACTITIONER

## 2025-05-28 PROCEDURE — G8427 DOCREV CUR MEDS BY ELIG CLIN: HCPCS | Performed by: NURSE PRACTITIONER

## 2025-05-28 PROCEDURE — 1036F TOBACCO NON-USER: CPT | Performed by: NURSE PRACTITIONER

## 2025-05-28 PROCEDURE — 99213 OFFICE O/P EST LOW 20 MIN: CPT | Performed by: NURSE PRACTITIONER

## 2025-05-28 RX ORDER — ESCITALOPRAM OXALATE 10 MG/1
10 TABLET ORAL DAILY
Qty: 30 TABLET | Refills: 5 | Status: SHIPPED | OUTPATIENT
Start: 2025-05-28

## 2025-05-28 SDOH — ECONOMIC STABILITY: FOOD INSECURITY: WITHIN THE PAST 12 MONTHS, YOU WORRIED THAT YOUR FOOD WOULD RUN OUT BEFORE YOU GOT MONEY TO BUY MORE.: NEVER TRUE

## 2025-05-28 SDOH — ECONOMIC STABILITY: FOOD INSECURITY: WITHIN THE PAST 12 MONTHS, THE FOOD YOU BOUGHT JUST DIDN'T LAST AND YOU DIDN'T HAVE MONEY TO GET MORE.: NEVER TRUE

## 2025-05-28 ASSESSMENT — PATIENT HEALTH QUESTIONNAIRE - PHQ9
10. IF YOU CHECKED OFF ANY PROBLEMS, HOW DIFFICULT HAVE THESE PROBLEMS MADE IT FOR YOU TO DO YOUR WORK, TAKE CARE OF THINGS AT HOME, OR GET ALONG WITH OTHER PEOPLE: SOMEWHAT DIFFICULT
8. MOVING OR SPEAKING SO SLOWLY THAT OTHER PEOPLE COULD HAVE NOTICED. OR THE OPPOSITE, BEING SO FIGETY OR RESTLESS THAT YOU HAVE BEEN MOVING AROUND A LOT MORE THAN USUAL: NOT AT ALL
SUM OF ALL RESPONSES TO PHQ QUESTIONS 1-9: 10
4. FEELING TIRED OR HAVING LITTLE ENERGY: NEARLY EVERY DAY
6. FEELING BAD ABOUT YOURSELF - OR THAT YOU ARE A FAILURE OR HAVE LET YOURSELF OR YOUR FAMILY DOWN: SEVERAL DAYS
SUM OF ALL RESPONSES TO PHQ QUESTIONS 1-9: 10
1. LITTLE INTEREST OR PLEASURE IN DOING THINGS: SEVERAL DAYS
SUM OF ALL RESPONSES TO PHQ QUESTIONS 1-9: 10
5. POOR APPETITE OR OVEREATING: NOT AT ALL
9. THOUGHTS THAT YOU WOULD BE BETTER OFF DEAD, OR OF HURTING YOURSELF: NOT AT ALL
SUM OF ALL RESPONSES TO PHQ QUESTIONS 1-9: 10
3. TROUBLE FALLING OR STAYING ASLEEP: NEARLY EVERY DAY
7. TROUBLE CONCENTRATING ON THINGS, SUCH AS READING THE NEWSPAPER OR WATCHING TELEVISION: SEVERAL DAYS
2. FEELING DOWN, DEPRESSED OR HOPELESS: SEVERAL DAYS

## 2025-05-28 ASSESSMENT — ENCOUNTER SYMPTOMS
WHEEZING: 0
SHORTNESS OF BREATH: 0
COUGH: 0
CHEST TIGHTNESS: 0

## 2025-05-28 NOTE — PROGRESS NOTES
Refill    levonorgestrel (MIRENA) IUD 52 mg 1 each by IntraUTERine route once      [DISCONTINUED] ibuprofen (ADVIL) 200 MG tablet Take 3 tablets by mouth every 8 hours as needed for Pain 60 tablet 0     No current facility-administered medications on file prior to visit.       Past Medical History:   Diagnosis Date    Chronic back pain     Depression 5/13/2024    Gestational diabetes     Hyperlipidemia      Past Surgical History:   Procedure Laterality Date    TONSILLECTOMY      TUBAL LIGATION      TYMPANOSTOMY TUBE PLACEMENT      x 7     Family History   Problem Relation Age of Onset    Depression Mother     Asthma Mother     Diabetes Mother     High Blood Pressure Mother     High Cholesterol Mother     Hypothyroidism Mother     High Blood Pressure Father     Diabetes Father         pre-DM    Hypothyroidism Maternal Aunt     Hypothyroidism Maternal Grandmother     Breast Cancer Maternal Grandmother     Arrhythmia Maternal Grandmother     Alcohol Abuse Maternal Grandfather     Heart Attack Paternal Grandfather     Alcohol Abuse Paternal Grandfather     Arrhythmia Paternal Grandfather     Asthma Child      Social History     Socioeconomic History    Marital status:      Spouse name: Not on file    Number of children: Not on file    Years of education: Not on file    Highest education level: Not on file   Occupational History    Not on file   Tobacco Use    Smoking status: Never    Smokeless tobacco: Never   Vaping Use    Vaping status: Never Used   Substance and Sexual Activity    Alcohol use: Not Currently     Comment: occ    Drug use: No    Sexual activity: Yes     Partners: Male   Other Topics Concern    Not on file   Social History Narrative    Not on file     Social Drivers of Health     Financial Resource Strain: Low Risk  (5/13/2024)    Overall Financial Resource Strain (CARDIA)     Difficulty of Paying Living Expenses: Not hard at all   Food Insecurity: No Food Insecurity (5/28/2025)    Hunger Vital